# Patient Record
Sex: MALE | Race: ASIAN
[De-identification: names, ages, dates, MRNs, and addresses within clinical notes are randomized per-mention and may not be internally consistent; named-entity substitution may affect disease eponyms.]

---

## 2017-01-13 ENCOUNTER — HOSPITAL ENCOUNTER (INPATIENT)
Dept: HOSPITAL 62 - ER | Age: 67
LOS: 3 days | Discharge: HOME | DRG: 191 | End: 2017-01-16
Attending: FAMILY MEDICINE | Admitting: FAMILY MEDICINE
Payer: MEDICARE

## 2017-01-13 DIAGNOSIS — E11.9: ICD-10-CM

## 2017-01-13 DIAGNOSIS — Z88.6: ICD-10-CM

## 2017-01-13 DIAGNOSIS — Z90.49: ICD-10-CM

## 2017-01-13 DIAGNOSIS — D35.01: ICD-10-CM

## 2017-01-13 DIAGNOSIS — E66.9: ICD-10-CM

## 2017-01-13 DIAGNOSIS — I10: ICD-10-CM

## 2017-01-13 DIAGNOSIS — E78.5: ICD-10-CM

## 2017-01-13 DIAGNOSIS — Z79.899: ICD-10-CM

## 2017-01-13 DIAGNOSIS — J45.901: ICD-10-CM

## 2017-01-13 DIAGNOSIS — Z88.8: ICD-10-CM

## 2017-01-13 DIAGNOSIS — Z85.038: ICD-10-CM

## 2017-01-13 DIAGNOSIS — J84.9: ICD-10-CM

## 2017-01-13 DIAGNOSIS — J44.1: Primary | ICD-10-CM

## 2017-01-13 DIAGNOSIS — F17.200: ICD-10-CM

## 2017-01-13 PROCEDURE — 87804 INFLUENZA ASSAY W/OPTIC: CPT

## 2017-01-13 PROCEDURE — 87077 CULTURE AEROBIC IDENTIFY: CPT

## 2017-01-13 PROCEDURE — 83735 ASSAY OF MAGNESIUM: CPT

## 2017-01-13 PROCEDURE — 71010: CPT

## 2017-01-13 PROCEDURE — 80048 BASIC METABOLIC PNL TOTAL CA: CPT

## 2017-01-13 PROCEDURE — 96374 THER/PROPH/DIAG INJ IV PUSH: CPT

## 2017-01-13 PROCEDURE — 85025 COMPLETE CBC W/AUTO DIFF WBC: CPT

## 2017-01-13 PROCEDURE — 82962 GLUCOSE BLOOD TEST: CPT

## 2017-01-13 PROCEDURE — 93010 ELECTROCARDIOGRAM REPORT: CPT

## 2017-01-13 PROCEDURE — 87205 SMEAR GRAM STAIN: CPT

## 2017-01-13 PROCEDURE — 94799 UNLISTED PULMONARY SVC/PX: CPT

## 2017-01-13 PROCEDURE — 84484 ASSAY OF TROPONIN QUANT: CPT

## 2017-01-13 PROCEDURE — 93005 ELECTROCARDIOGRAM TRACING: CPT

## 2017-01-13 PROCEDURE — 71275 CT ANGIOGRAPHY CHEST: CPT

## 2017-01-13 PROCEDURE — 82553 CREATINE MB FRACTION: CPT

## 2017-01-13 PROCEDURE — 94640 AIRWAY INHALATION TREATMENT: CPT

## 2017-01-13 PROCEDURE — 80053 COMPREHEN METABOLIC PANEL: CPT

## 2017-01-13 PROCEDURE — 82803 BLOOD GASES ANY COMBINATION: CPT

## 2017-01-13 PROCEDURE — 99291 CRITICAL CARE FIRST HOUR: CPT

## 2017-01-13 PROCEDURE — 36415 COLL VENOUS BLD VENIPUNCTURE: CPT

## 2017-01-13 PROCEDURE — 82550 ASSAY OF CK (CPK): CPT

## 2017-01-13 PROCEDURE — 87070 CULTURE OTHR SPECIMN AEROBIC: CPT

## 2017-01-13 PROCEDURE — 87040 BLOOD CULTURE FOR BACTERIA: CPT

## 2017-01-13 NOTE — ER DOCUMENT REPORT
ED Respiratory Problem





- General


Chief Complaint: Breathing Difficulty


Stated Complaint: TROUBLE BREATHING


Time seen by provider: 23:53


Mode of Arrival: Ambulatory


Information source: Patient, Relative - Wife


TRAVEL OUTSIDE OF THE [place] IN LAST 30 DAYS: No





- HPI


Patient complains to provider of: Asthma, COPD, Cough, Short of breath


Onset: This morning


Duration: Worse/persistent


Quality of pain: No pain


Severity: Moderate


Context: Hx COPD, Smoker


Short of Breath: Moderate


Chest pain/discomfort: Tightness


Cough: Nonproductive


At home treatment: Bronchodilators


Associated symptoms: Congestion, Cough, Difficulty breathing, Short of breath, 

Wheezing


Similar symptoms previously: Yes


Recently seen / treated by doctor: No


Notes: 


Patient is a 66-year-old male who is a smoker and has a reported history of 

asthma, who presents to the emergency room complaining of difficulty breathing 

with a dry cough and chest tightness that started early in the morning and 

worsened throughout the day, he has used albuterol at home with minimal relief, 

he denies any sick contacts, no recent travel





- Related Data


Allergies/Adverse Reactions: 


 





diphenhydramine [From Benadryl] Adverse Reaction (Intermediate, Verified 01/14/ 17 00:23)


 


ibuprofen [From Motrin] Adverse Reaction (Intermediate, Verified 01/14/17 00:29)


 Bleeding











Past Medical History





- General


Information source: Patient, Relative - Wife





- Social History


Smoking Status: Current Every Day Smoker


Frequency of alcohol use: None


Drug Abuse: None


Family History: Reviewed & Not Pertinent


Renal/ Medical History: Denies: Hx Peritoneal Dialysis





Review of Systems





- Review of Systems


Constitutional: No symptoms reported


EENT: No symptoms reported


Cardiovascular: No symptoms reported


Respiratory: See HPI


Gastrointestinal: No symptoms reported


Genitourinary: No symptoms reported


Male Genitourinary: No symptoms reported


Musculoskeletal: No symptoms reported


Skin: No symptoms reported


Hematologic/Lymphatic: No symptoms reported


Neurological/Psychological: No symptoms reported


-: Yes All other systems reviewed and negative





Physical Exam





- Vital signs


Vitals: 


 











Resp


 


 24 H


 


 01/13/17 23:30











Interpretation: Normal





- General


General appearance: Appears well, Alert





- HEENT


Head: Normocephalic, Atraumatic


Eyes: Normal


Pupils: PERRL





- Respiratory


Respiratory status: Respiratory distress, Labored, Tachypnea


Chest status: Nontender


Breath sounds: Decreased air movement, Nonproductive cough, Wheezing


Chest palpation: Normal





- Cardiovascular


Rhythm: Regular


Heart sounds: Normal auscultation


Murmur: No





- Abdominal


Inspection: Normal, Other - Healed midline scar


Distension: No distension


Bowel sounds: Normal


Tenderness: Nontender


Organomegaly: No organomegaly





- Back


Back: Normal, Nontender





- Extremities


General upper extremity: Normal inspection, Nontender, Normal color, Normal ROM

, Normal temperature


General lower extremity: Normal inspection, Nontender, Normal color, Normal ROM

, Normal temperature, Normal weight bearing.  No: Carol's sign





- Neurological


Neuro grossly intact: Yes


Cognition: Normal


Orientation: AAOx4


Josh Coma Scale Eye Opening: Spontaneous


Reading Coma Scale Verbal: Oriented


Josh Coma Scale Motor: Obeys Commands


Josh Coma Scale Total: 15


Speech: Normal


Motor strength normal: LUE, RUE, LLE, RLE


Sensory: Normal





- Psychological


Associated symptoms: Normal affect, Normal mood





- Skin


Skin Temperature: Warm


Skin Moisture: Dry


Skin Color: Normal





Course





- Re-evaluation


Re-evalutation: 





01/14/17 02:50


Patient hypoxic and tachycardic on arrival with difficulty breathing that 

started earlier in the day and Crestor the day, symptoms likely result of 

smoking and COPD, however a CTA was performed to rule out a PE, no PE was seen 

but worsening diffuse interstitial lung markings consistent with chronic lung 

disease were seen, patient had multiple breathing treatments in the emergency 

room with moderate relief of symptoms, however still continues to have diffuse 

wheezing with a pulse ox in the 88-92 range, he did briefly remove his oxygen 

and his pulse ox dropped down to 85%, therefore patient was discussed with the 

hospitalist who agrees to admit for further evaluation and treatment





- Vital Signs


Vital signs: 


 











Temp Pulse Resp BP Pulse Ox


 


 98.6 F   113 H  24 H  144/65 H  91 L


 


 01/13/17 23:33  01/13/17 23:33  01/13/17 23:33  01/13/17 23:33  01/14/17 00:00














- Laboratory


Result Diagrams: 


 01/14/17 00:15





 01/14/17 00:15


Laboratory results interpreted by me: 


 











  01/14/17 01/14/17





  00:15 00:15


 


WBC  12.8 H 


 


RBC  4.11 L 


 


Hgb  11.8 L 


 


Hct  37.8 L 


 


MCHC  31.1 L 


 


RDW  14.2 H 


 


Seg Neutrophils %  83.8 H 


 


Lymphocytes %  8.1 L 


 


Absolute Neutrophils  10.7 H 


 


BUN   23 H


 


Calcium   10.4 H


 


Creatine Kinase   544 H














- Diagnostic Test


Radiology reviewed: Image reviewed, Reports reviewed





- EKG Interpretation by Me


EKG shows normal: Sinus rhythm


Rate: Tachycardia





- Transfer of Care


Care transferred to following provider: Dr. Isaac





Critical Care Note





- Critical Care Note


Total time excluding time spent on procedures (mins): 40


Comments: 


Patient arrived hypoxic and tachycardic, requiring multiple breathing treatments

, close observation and admission for COPD exacerbation





Discharge





- Discharge


Clinical Impression: 


 COPD exacerbation





Condition: Fair


Disposition: ADMITTED AS INPATIENT


Admitting Provider: Hospitalist


Unit Admitted: Telemetry

## 2017-01-14 LAB
ALBUMIN SERPL-MCNC: 4.1 G/DL (ref 3.5–5)
ALP SERPL-CCNC: 73 U/L (ref 38–126)
ALT SERPL-CCNC: 42 U/L (ref 21–72)
ANION GAP SERPL CALC-SCNC: 14 MMOL/L (ref 5–19)
AST SERPL-CCNC: 30 U/L (ref 17–59)
BASE EXCESS BLDV CALC-SCNC: -0.7 MMOL/L
BASE EXCESS BLDV CALC-SCNC: 2.5 MMOL/L
BASOPHILS # BLD AUTO: 0.1 10^3/UL (ref 0–0.2)
BASOPHILS NFR BLD AUTO: 0.5 % (ref 0–2)
BILIRUB DIRECT SERPL-MCNC: 0 MG/DL (ref 0–0.3)
BILIRUB SERPL-MCNC: 0.3 MG/DL (ref 0.2–1.3)
BUN SERPL-MCNC: 23 MG/DL (ref 7–20)
CALCIUM: 10.4 MG/DL (ref 8.4–10.2)
CHLORIDE SERPL-SCNC: 103 MMOL/L (ref 98–107)
CK MB SERPL-MCNC: 2.89 NG/ML (ref ?–4.55)
CK SERPL-CCNC: 544 U/L (ref 55–170)
CO2 SERPL-SCNC: 28 MMOL/L (ref 22–30)
CREAT SERPL-MCNC: 1.18 MG/DL (ref 0.52–1.25)
EOSINOPHIL # BLD AUTO: 0.1 10^3/UL (ref 0–0.6)
EOSINOPHIL NFR BLD AUTO: 1.2 % (ref 0–6)
ERYTHROCYTE [DISTWIDTH] IN BLOOD BY AUTOMATED COUNT: 14.2 % (ref 11.5–14)
GLUCOSE SERPL-MCNC: 96 MG/DL (ref 75–110)
HCO3 BLDV-SCNC: 24.7 MMOL/L (ref 20–32)
HCO3 BLDV-SCNC: 29.2 MMOL/L (ref 20–32)
HCT VFR BLD CALC: 37.8 % (ref 37.9–51)
HGB BLD-MCNC: 11.8 G/DL (ref 13.5–17)
HGB HCT DIFFERENCE: -2.4
LYMPHOCYTES # BLD AUTO: 1 10^3/UL (ref 0.5–4.7)
LYMPHOCYTES NFR BLD AUTO: 8.1 % (ref 13–45)
MAGNESIUM SERPL-MCNC: 1.7 MG/DL (ref 1.6–2.3)
MCH RBC QN AUTO: 28.6 PG (ref 27–33.4)
MCHC RBC AUTO-ENTMCNC: 31.1 G/DL (ref 32–36)
MCV RBC AUTO: 92 FL (ref 80–97)
MONOCYTES # BLD AUTO: 0.8 10^3/UL (ref 0.1–1.4)
MONOCYTES NFR BLD AUTO: 6.4 % (ref 3–13)
NEUTROPHILS # BLD AUTO: 10.7 10^3/UL (ref 1.7–8.2)
NEUTS SEG NFR BLD AUTO: 83.8 % (ref 42–78)
PCO2 BLDV: 43.6 MMHG (ref 35–63)
PCO2 BLDV: 53.2 MMHG (ref 35–63)
PH BLDV: 7.36 [PH] (ref 7.3–7.42)
PH BLDV: 7.37 [PH] (ref 7.3–7.42)
POTASSIUM SERPL-SCNC: 4.4 MMOL/L (ref 3.6–5)
PROT SERPL-MCNC: 6.9 G/DL (ref 6.3–8.2)
RBC # BLD AUTO: 4.11 10^6/UL (ref 4.35–5.55)
SODIUM SERPL-SCNC: 144.7 MMOL/L (ref 137–145)
TROPONIN I SERPL-MCNC: 0.01 NG/ML
WBC # BLD AUTO: 12.8 10^3/UL (ref 4–10.5)

## 2017-01-14 PROCEDURE — 3E0F73Z INTRODUCTION OF ANTI-INFLAMMATORY INTO RESPIRATORY TRACT, VIA NATURAL OR ARTIFICIAL OPENING: ICD-10-PCS | Performed by: FAMILY MEDICINE

## 2017-01-14 RX ADMIN — Medication SCH ML: at 14:46

## 2017-01-14 RX ADMIN — IPRATROPIUM BROMIDE AND ALBUTEROL SULFATE SCH ML: 2.5; .5 SOLUTION RESPIRATORY (INHALATION) at 14:49

## 2017-01-14 RX ADMIN — LANSOPRAZOLE SCH MG: 30 TABLET, ORALLY DISINTEGRATING, DELAYED RELEASE ORAL at 05:58

## 2017-01-14 RX ADMIN — METHYLPREDNISOLONE SODIUM SUCCINATE SCH MG: 125 INJECTION, POWDER, FOR SOLUTION INTRAMUSCULAR; INTRAVENOUS at 09:05

## 2017-01-14 RX ADMIN — INSULIN LISPRO PRN UNIT: 100 INJECTION, SOLUTION INTRAVENOUS; SUBCUTANEOUS at 06:20

## 2017-01-14 RX ADMIN — TAMSULOSIN HYDROCHLORIDE SCH MG: 0.4 CAPSULE ORAL at 09:08

## 2017-01-14 RX ADMIN — ENOXAPARIN SODIUM SCH MG: 40 INJECTION SUBCUTANEOUS at 08:59

## 2017-01-14 RX ADMIN — Medication SCH ML: at 21:10

## 2017-01-14 RX ADMIN — FLUTICASONE PROPIONATE AND SALMETEROL SCH INH: 50; 250 POWDER RESPIRATORY (INHALATION) at 09:01

## 2017-01-14 RX ADMIN — SODIUM CHLORIDE PRN ML: 0.45 INJECTION, SOLUTION INTRAVENOUS at 06:02

## 2017-01-14 RX ADMIN — MONTELUKAST SODIUM SCH MG: 10 TABLET, FILM COATED ORAL at 21:10

## 2017-01-14 RX ADMIN — Medication SCH ML: at 06:00

## 2017-01-14 RX ADMIN — ATORVASTATIN CALCIUM SCH MG: 40 TABLET, FILM COATED ORAL at 21:09

## 2017-01-14 RX ADMIN — SODIUM CHLORIDE PRN ML: 0.45 INJECTION, SOLUTION INTRAVENOUS at 18:50

## 2017-01-14 RX ADMIN — CEFTRIAXONE SCH ML: 1 INJECTION, SOLUTION INTRAVENOUS at 21:09

## 2017-01-14 RX ADMIN — IPRATROPIUM BROMIDE AND ALBUTEROL SULFATE SCH ML: 2.5; .5 SOLUTION RESPIRATORY (INHALATION) at 08:49

## 2017-01-14 RX ADMIN — BENAZEPRIL HYDROCHLORIDE SCH MG: 20 TABLET ORAL at 09:06

## 2017-01-14 RX ADMIN — IPRATROPIUM BROMIDE AND ALBUTEROL SULFATE SCH ML: 2.5; .5 SOLUTION RESPIRATORY (INHALATION) at 20:41

## 2017-01-14 RX ADMIN — METHYLPREDNISOLONE SODIUM SUCCINATE SCH MG: 125 INJECTION, POWDER, FOR SOLUTION INTRAMUSCULAR; INTRAVENOUS at 17:10

## 2017-01-14 RX ADMIN — AMLODIPINE BESYLATE SCH MG: 10 TABLET ORAL at 09:07

## 2017-01-14 RX ADMIN — AZITHROMYCIN MONOHYDRATE SCH MG: 500 INJECTION, POWDER, LYOPHILIZED, FOR SOLUTION INTRAVENOUS at 14:46

## 2017-01-14 RX ADMIN — CETIRIZINE HYDROCHLORIDE SCH MG: 10 TABLET, FILM COATED ORAL at 14:45

## 2017-01-14 RX ADMIN — INSULIN LISPRO PRN UNIT: 100 INJECTION, SOLUTION INTRAVENOUS; SUBCUTANEOUS at 17:22

## 2017-01-14 RX ADMIN — FLUTICASONE PROPIONATE AND SALMETEROL SCH INH: 50; 250 POWDER RESPIRATORY (INHALATION) at 21:10

## 2017-01-14 NOTE — PDOC H&P
History of Present Illness


Admission Date/PCP: 


  01/14/17 02:43





  


\[facility]\""


Patient complains of: difficulty breathing


History of Present Illness: 


ERIN MORENO is a 66 year old male is known underlying asthma, having 

undergone COPD testing one week ago, with results still pending, along with 

half pack-a-day smoker, who presents to the emergency room for evaluation of 

above complaint.





Patient has been discussed with emergency room physician who evaluated the 

patient. 





Has had a dry cough and mild chest discomfort for the past several days.  

However, over the last 24 hours, he has had progressive difficulty breathing, 

particularly noticeable with much of any exertion.  Was in significant 

respiratory distress upon arrival in the emergency room, but has progressed 

nicely with treatment so far.  However, still some wheezing, which he does on 

most days, and some shortness of breath, along with oxygen requirement.  

Hospitalist service contacted for admission.





Subjective fever.  No nausea vomiting or diarrhea.  No antibiotics or hospital 

stay in the last 3 months.  No prior intubation for respiratory distress.





Mild chest tightness and discomfort when his shortness of breath is worse.  

None at present..











Laboratory results are listed in [facility] and are reviewed. 








X-ray summary results are listed below, with full report(s) reviewed. .











EKG reviewed. 











Social history/personal habits: .  Has children.  Retired.  Half-pack of 

cigarettes per day.  No alcohol or illicit drug use.











Allergies/adverse reactions are listed in [facility] and are reviewed. 








Home medications are reviewed by bottle review and discussion with patient and 

wife, who is present at his side with his approval, and are to be [facility] 

[facility]. Home medications initially autopopulated into 

[employer] for [employer] may not accurately reflect patient's true medications, dosages, and/or 

frequencies. 





Compliant with medications.  No recent medication changes.











REVIEW OF SYSTEMS: 





Constitutional:  See history and present illness. 





Eyes:   No current vision complaints. 





ENT: No swallowing problems or complaints.   No hearing problems or complaints. 





Pulmonary:  See history and present illness. 





Cardiovascular:  See history and present illness.  





Gastrointestinal: No current complaints, including nausea or vomiting. 





Skin: No current complaints, including rashes. 





Hematologic: No unusual easy bruising or bleeding.  





Neurologic: No current complaints, including numbness or tingling. 





Musculoskeletal: No current complaints, including painful joints.   





Psychiatric: No current complaints, including anxiety or depression.   





Endocrine: No current complaints, including polyuria. 





Genitourinary: No current complaints, including dysuria. 








PHYSICAL EXAMINATION:


 


5 feet 5 inches tall.  92.1 kg.  Blood pressure 123/94.  Pulse 118 and regular.

  Respirations are 22 and unlabored.  91% saturation on 2 L oxygen per nasal 

cannula.  Temperature 98.6.





Obese otherwise well-developed probable [place] male, who appears to feel a 

bit under the weather, so to speak.  Pleasant awake alert and cooperative 

however. mildly anxious, without agitation.





Skin is warm and dry.  No grossly obvious evidence of rash in areas of skin 

examined.  No subcutaneous nodules palpated. 





ENT: Hearing grossly normal  to normal conversation.  Tongue midline on 

protrusion pink and slightly tacky.  





Eyes: No scleral icterus.  Pupils equal and reactive to light at 4 mm.  Pink 

conjunctivae. 





Neck is supple and nontender to gentle active range of motion and palpation.  

Midline trachea.  No palpable thyroid nodule mass enlargement or tenderness. 





Lymphatic: No palpable cervical or clavicular nodes. 





Neck and lymphatic exams limited by patient body habitus. 





Psychiatric: Reasonable insight into acute and chronic medical issues.  

Oriented to time location and why here. 





Lungs: Auscultation reveals equal breath sounds bilaterally.  No use of 

accessory respiratory muscles.  Faint brief expiratory wheezing bilaterally, 

with mild diffuse slightly coarse breath sounds.





Cardiovascular: Heart regular rate and rhythm, without gallop murmur or rub.  

No carotid or abdominal aortic bruits.  No ankle or pedal edema.   palpable 

dorsalis pedis pulses. 





Abdomen: soft,  obese,   nontender with positive bowel sounds.  Unable to 

adequately evaluate abdomen for masses or organomegaly due to body habitus.





Extremities: Feet are warm and dry.  No calf tenderness to compression.  No 

grossly obvious visual evidence of calf swelling.  Gentle manipulation of lower 

extremities fails to reveal any obvious evidence of injury or instability to 

knees hips or ankles. 





Neurologic: Moves upper extremities grossly normally.  Patellar reflexes 

absent.  Absent Babinski.  Light touch is intact at feet.  Dorsiflexion and 

plantarflexion of feet 5 / 5 and symmetric. 











Past Medical History


Cardiac Medical History: Reports: Hypertension


Pulmonary Medical History: Reports: Asthma


Endocrine Medical History: Reports: Diabetes Mellitus Type 2





Past Surgical History


Past Surgical History: Reports: Other - Segmental colectomy in the 1990s for 

colon cancer.





Social History


Information Source: Patient, Emergency Med Personnel, [facility] Records


Lives with: Spouse/Significant other


Smoking Status: Current Every Day Smoker


Frequency of Alcohol Use: None


Drugs: None





- Advance Directive


Resuscitation Status: Full Code


Surrogate healthcare decision maker:: 


Wife





Family History


Family History: Reviewed & Not Pertinent


Parental Family History Reviewed: Yes


Children Family History Reviewed: Yes


Sibling(s) Family History Reviewed.: Yes





Medication/Allergy


Home Medications: 








Albuterol Sulfate [Proair HFA] 2 puff IH Q4HP PRN 01/14/17 


Atorvastatin Calcium 40 mg PO QHS 01/14/17 


Fexofenadine HCl 180 mg PO DAILY 01/14/17 


Fluticasone/Salmeterol [Advair 250-50 Diskus] 1 puff IH BID 01/14/17 


Metformin HCl [Metformin HCl ER] 500 mg PO BID 01/14/17 


Montelukast Sodium 10 mg PO QHS 01/14/17 


Tamsulosin HCl 0.8 mg PO DAILY 01/14/17 


Benazepril HCl [Lotensin 20 mg Tablet] 40 mg PO QAM #60 tablet 01/16/17 


Levalbuterol HCl [Xopenex Neb 1.25 mg/3 ml Ampul] 1.25 mg NEB Q4H #60 vial.neb 

01/16/17 


Levofloxacin [Levaquin 750 mg Tablet] 750 mg PO DAILY #5 tablet 01/16/17 


Metoprolol Tartrate [Lopressor 25 mg Tablet] 25 mg PO Q12 #60 tab 01/16/17 


Prednisone [Sterapred Ds] 1 pkg PO ASDIR PRN 12 Days 01/16/17 


Tiotropium Bromide [Spiriva Handihaler 18 mcg/dose (30 Dose)] 1 cap IH DAILY #

30 capsule 01/16/17 








Allergies/Adverse Reactions: 


 





diphenhydramine [From Benadryl] Adverse Reaction (Intermediate, Verified 01/14/ 17 00:23)


 


ibuprofen [From Motrin] Adverse Reaction (Intermediate, Verified 01/14/17 00:29)


 Bleeding











Physical Exam


Vital Signs: 


 











Temp Pulse Resp BP Pulse Ox


 


 98.6 F   113 H  24 H  144/65 H  91 L


 


 01/13/17 23:33  01/13/17 23:33  01/13/17 23:33  01/13/17 23:33  01/14/17 00:00














Results


Impressions: 


 





Chest X-Ray  01/13/17 23:42


IMPRESSION:  Worsened moderate chronic interstitial lung disease pattern.


 








Chest/Abdomen CTA  01/14/17 01:22


IMPRESSION:


1. No evidence of pulmonary emboli.


2. Moderate chronic interstitial lung disease pattern.  Interval worsening.


3. 1.9 cm right adrenal adenoma.


 














Assessment & Plan





- Diagnosis


(1) Adrenal adenoma


Qualifiers: 


     Laterality: right        Qualified Code(s): D35.01 - Benign neoplasm of 

right adrenal gland  


Is this a current diagnosis for this admission?: YesPlan: 


Outpatient follow-up by primary care provider.








(2) Asthma exacerbation


Is this a current diagnosis for this admission?: YesPlan: 


Patient will be admitted under COPD and asthma exacerbation  protocol.  

Incentive spirometry twice a day.  Scheduled    DuoNeb's.  PRN  albuterol nebs





Solu-Medrol    Prevacid for gastritis prophylaxis. 





Antibiotics will consist of intravenous Zithromax along with Rocephin..





I strongly encouraged patient to notify staff should patient feel that 

respiratory status is worsening.





Patient is a full code.





I have strongly encouraged patient not to get out of bed without notifying staff

, , to avoid a fall with injury.





Knee high SCDs for DVT prophylaxis, along with subcutaneous Lovenox 





Impression and plans were discussed with patient, and wife, both of whom concur.





Time spent in evaluation and management of patient: 70 minutes.








(3) Diabetes mellitus type 2 in obese


Is this a current diagnosis for this admission?: YesPlan: 


Ice chips at the present time.  Advance diet as tolerated. Resume home 

medications as appropriate once these have been  reviewed.  We'll hold 

metformin given his CT angiogram.  Accu-Cheks with appropriate sliding scale 

coverage.








(4) HTN (hypertension)


Qualifiers: 


     Hypertension type: essential hypertension        Qualified Code(s): I10 - 

Essential (primary) hypertension  


Is this a current diagnosis for this admission?: YesPlan: 


Resume home medications as appropriate once these have been  reviewed.  








(5) Hyperlipidemia


Qualifiers: 


     Hyperlipidemia type: unspecified        Qualified Code(s): E78.5 - 

Hyperlipidemia, unspecified  


Is this a current diagnosis for this admission?: YesPlan: 


Resume home medications as appropriate once these have been  reviewed.  








(6) Tobacco dependency


Is this a current diagnosis for this admission?: YesPlan: 


When necessary nicotine patch.








(7) COPD exacerbation


Is this a current diagnosis for this admission?: Yes








- Inpatient Certification


Based on my medical assessment, after consideration of the patient's 

comorbidities, presenting symptoms, or acuity I expect that the services needed 

warrant INPATIENT care.: Yes


I certify that my determination is in accordance with my understanding of 

Medicare's requirements for reasonable and necessary INPATIENT services [42 CFR 

412.3e].: Yes


Medical Necessity: Need Close Monitoring Due to Risk of Patient Decompensation, 

Need For IV Fluids, Need For Continuous Telemetry Monitoring, Need for 

Nebulizer Therapy and Monitoring of Response, Need for IV Antibiotics, Risk of 

Complication if Not Cared For in Hospital


Post Hospital Care: D/C or Transfer Summary

## 2017-01-14 NOTE — EKG REPORT
SEVERITY:- OTHERWISE NORMAL ECG -

SINUS TACHYCARDIA

:

Confirmed by: Ibeth Sanabria MD 14-Jan-2017 09:22:49

## 2017-01-15 LAB
ANION GAP SERPL CALC-SCNC: 13 MMOL/L (ref 5–19)
ANISOCYTOSIS BLD QL SMEAR: SLIGHT
BASOPHILS NFR BLD MANUAL: 0 % (ref 0–2)
BUN SERPL-MCNC: 24 MG/DL (ref 7–20)
CALCIUM: 10.1 MG/DL (ref 8.4–10.2)
CHLORIDE SERPL-SCNC: 110 MMOL/L (ref 98–107)
CO2 SERPL-SCNC: 27 MMOL/L (ref 22–30)
CREAT SERPL-MCNC: 1.14 MG/DL (ref 0.52–1.25)
EOSINOPHIL NFR BLD MANUAL: 0 % (ref 0–6)
ERYTHROCYTE [DISTWIDTH] IN BLOOD BY AUTOMATED COUNT: 14.6 % (ref 11.5–14)
GLUCOSE SERPL-MCNC: 163 MG/DL (ref 75–110)
HCT VFR BLD CALC: 36.5 % (ref 37.9–51)
HGB BLD-MCNC: 11.7 G/DL (ref 13.5–17)
HGB HCT DIFFERENCE: -1.4
MCH RBC QN AUTO: 29.1 PG (ref 27–33.4)
MCHC RBC AUTO-ENTMCNC: 32.2 G/DL (ref 32–36)
MCV RBC AUTO: 91 FL (ref 80–97)
NEUTS BAND NFR BLD MANUAL: 5 % (ref 3–5)
OVALOCYTES BLD QL SMEAR: SLIGHT
POTASSIUM SERPL-SCNC: 4.7 MMOL/L (ref 3.6–5)
RBC # BLD AUTO: 4.03 10^6/UL (ref 4.35–5.55)
SODIUM SERPL-SCNC: 149.7 MMOL/L (ref 137–145)
TOTAL CELLS COUNTED BLD: 100
TOXIC GRANULES BLD QL SMEAR: SLIGHT
VARIANT LYMPHS NFR BLD MANUAL: 4 % (ref 13–45)
WBC # BLD AUTO: 16.6 10^3/UL (ref 4–10.5)

## 2017-01-15 RX ADMIN — METHYLPREDNISOLONE SODIUM SUCCINATE SCH MG: 125 INJECTION, POWDER, FOR SOLUTION INTRAMUSCULAR; INTRAVENOUS at 10:14

## 2017-01-15 RX ADMIN — LEVALBUTEROL HYDROCHLORIDE SCH MG: 1.25 SOLUTION RESPIRATORY (INHALATION) at 14:19

## 2017-01-15 RX ADMIN — Medication SCH ML: at 05:46

## 2017-01-15 RX ADMIN — FLUTICASONE PROPIONATE AND SALMETEROL SCH INH: 50; 250 POWDER RESPIRATORY (INHALATION) at 10:14

## 2017-01-15 RX ADMIN — Medication SCH ML: at 21:59

## 2017-01-15 RX ADMIN — CEFTRIAXONE SCH ML: 1 INJECTION, SOLUTION INTRAVENOUS at 21:59

## 2017-01-15 RX ADMIN — CETIRIZINE HYDROCHLORIDE SCH MG: 10 TABLET, FILM COATED ORAL at 10:21

## 2017-01-15 RX ADMIN — TAMSULOSIN HYDROCHLORIDE SCH MG: 0.4 CAPSULE ORAL at 10:14

## 2017-01-15 RX ADMIN — ATORVASTATIN CALCIUM SCH MG: 40 TABLET, FILM COATED ORAL at 21:58

## 2017-01-15 RX ADMIN — AZITHROMYCIN MONOHYDRATE SCH MG: 500 INJECTION, POWDER, LYOPHILIZED, FOR SOLUTION INTRAVENOUS at 10:14

## 2017-01-15 RX ADMIN — INSULIN LISPRO PRN UNIT: 100 INJECTION, SOLUTION INTRAVENOUS; SUBCUTANEOUS at 18:04

## 2017-01-15 RX ADMIN — MONTELUKAST SODIUM SCH MG: 10 TABLET, FILM COATED ORAL at 21:58

## 2017-01-15 RX ADMIN — METHYLPREDNISOLONE SODIUM SUCCINATE SCH MG: 125 INJECTION, POWDER, FOR SOLUTION INTRAMUSCULAR; INTRAVENOUS at 02:19

## 2017-01-15 RX ADMIN — FLUTICASONE PROPIONATE AND SALMETEROL SCH INH: 50; 250 POWDER RESPIRATORY (INHALATION) at 21:58

## 2017-01-15 RX ADMIN — AMLODIPINE BESYLATE SCH MG: 10 TABLET ORAL at 07:50

## 2017-01-15 RX ADMIN — IPRATROPIUM BROMIDE SCH MG: 0.5 SOLUTION RESPIRATORY (INHALATION) at 20:05

## 2017-01-15 RX ADMIN — INSULIN LISPRO PRN UNIT: 100 INJECTION, SOLUTION INTRAVENOUS; SUBCUTANEOUS at 07:49

## 2017-01-15 RX ADMIN — ENOXAPARIN SODIUM SCH MG: 40 INJECTION SUBCUTANEOUS at 07:49

## 2017-01-15 RX ADMIN — LANSOPRAZOLE SCH MG: 30 TABLET, ORALLY DISINTEGRATING, DELAYED RELEASE ORAL at 05:46

## 2017-01-15 RX ADMIN — LEVALBUTEROL HYDROCHLORIDE SCH MG: 1.25 SOLUTION RESPIRATORY (INHALATION) at 20:05

## 2017-01-15 RX ADMIN — Medication SCH ML: at 13:30

## 2017-01-15 RX ADMIN — INSULIN LISPRO PRN UNIT: 100 INJECTION, SOLUTION INTRAVENOUS; SUBCUTANEOUS at 12:26

## 2017-01-15 RX ADMIN — METOPROLOL TARTRATE SCH MG: 25 TABLET, FILM COATED ORAL at 20:46

## 2017-01-15 RX ADMIN — IPRATROPIUM BROMIDE AND ALBUTEROL SULFATE SCH ML: 2.5; .5 SOLUTION RESPIRATORY (INHALATION) at 08:26

## 2017-01-15 RX ADMIN — IPRATROPIUM BROMIDE SCH MG: 0.5 SOLUTION RESPIRATORY (INHALATION) at 14:19

## 2017-01-15 RX ADMIN — SODIUM CHLORIDE PRN ML: 0.45 INJECTION, SOLUTION INTRAVENOUS at 03:54

## 2017-01-15 NOTE — PDOC PROGRESS REPORT
Subjective


Progress Note for:: 01/15/17


Subjective:: 


Patient is breathing better.  Wheezing is less.  Shortness of breath improved 

by 50%.  Positive cough but no pleurisy.  No chest pressure.  No diarrhea, no 

chills or fever.  No nausea vomiting abdominal pain.





Physical Exam


Vital Signs: 


 











Temp Pulse Resp BP Pulse Ox


 


 98.2 F   104 H  16   142/85 H  92 


 


 01/15/17 07:36  01/15/17 08:26  01/15/17 08:26  01/15/17 07:36  01/15/17 07:36








 Intake & Output











 01/14/17 01/15/17 01/16/17





 06:59 06:59 06:59


 


Intake Total  3949 


 


Output Total  650 


 


Balance  3299 


 


Weight  87 kg 











General appearance: PRESENT: no acute distress, obese


Head exam: PRESENT: normocephalic


Eye exam: PRESENT: EOMI, PERRLA


Mouth exam: PRESENT: moist, neck supple


Neck exam: ABSENT: JVD


Respiratory exam: PRESENT: wheezes - Minimal bilateral, other - Air entry is 

fair to good.


Cardiovascular exam: PRESENT: RRR, tachycardia


GI/Abdominal exam: PRESENT: normal bowel sounds, soft.  ABSENT: distended, 

tenderness


Extremities exam: ABSENT: pedal edema


Neurological exam: PRESENT: alert, awake, oriented to situation


Skin exam: PRESENT: dry, warm.  ABSENT: cyanosis





Results


Laboratory Results: 


 





 01/15/17 04:54 





 01/15/17 04:54 





 











  01/15/17 01/15/17





  04:54 04:54


 


WBC  16.6 H 


 


RBC  4.03 L 


 


Hgb  11.7 L 


 


Hct  36.5 L 


 


MCV  91 


 


MCH  29.1 


 


MCHC  32.2 


 


RDW  14.6 H 


 


Plt Count  274 


 


Seg Neutrophils %  Not Reportable 


 


Lymphocytes %  Not Reportable 


 


Monocytes %  Not Reportable 


 


Eosinophils %  Not Reportable 


 


Basophils %  Not Reportable 


 


Absolute Neutrophils  Not Reportable 


 


Absolute Lymphocytes  Not Reportable 


 


Absolute Monocytes  Not Reportable 


 


Absolute Eosinophils  Not Reportable 


 


Absolute Basophils  Not Reportable 


 


Sodium   149.7 H


 


Potassium   4.7


 


Chloride   110 H


 


Carbon Dioxide   27


 


Anion Gap   13


 


BUN   24 H


 


Creatinine   1.14


 


Est GFR ( Amer)   > 60


 


Est GFR (Non-Af Amer)   > 60


 


Glucose   163 H


 


Calcium   10.1











Impressions: 


 





Chest X-Ray  01/13/17 23:42


IMPRESSION:  Worsened moderate chronic interstitial lung disease pattern.


 








Chest/Abdomen CTA  01/14/17 01:22


IMPRESSION:


1. No evidence of pulmonary emboli.


2. Moderate chronic interstitial lung disease pattern.  Interval worsening.


3. 1.9 cm right adrenal adenoma.


 














Assessment & Plan





- Diagnosis


(1) COPD exacerbation


Is this a current diagnosis for this admission?: Yes





(2) Interstitial lung disease


Is this a current diagnosis for this admission?: Yes





(3) Adrenal adenoma


Qualifiers: 


     Laterality: right        Qualified Code(s): D35.01 - Benign neoplasm of 

right adrenal gland  


Is this a current diagnosis for this admission?: Yes





(4) Diabetes mellitus type 2 in obese


Is this a current diagnosis for this admission?: Yes





(5) HTN (hypertension)


Qualifiers: 


     Hypertension type: essential hypertension        Qualified Code(s): I10 - 

Essential (primary) hypertension  


Is this a current diagnosis for this admission?: Yes





(6) Hyperlipidemia


Qualifiers: 


     Hyperlipidemia type: unspecified        Qualified Code(s): E78.5 - 

Hyperlipidemia, unspecified  


Is this a current diagnosis for this admission?: Yes








- Time


Time Spent with patient: 25-34 minutes





- Plan Summary


Plan Summary: 


Patient is wanting to go home.  I advised him to stay for an order today and 

hopefully by tomorrow we will be able to.  We are going to switch the patient 

to oral steroids.  Discontinue albuterol as switched to Xopenex.  Continue 

ipratropium.  Decrease intravenous fluids.  Add Spiriva.  Continue supportive 

care.

## 2017-01-16 VITALS — DIASTOLIC BLOOD PRESSURE: 86 MMHG | SYSTOLIC BLOOD PRESSURE: 128 MMHG

## 2017-01-16 RX ADMIN — Medication SCH ML: at 05:40

## 2017-01-16 RX ADMIN — LEVALBUTEROL HYDROCHLORIDE SCH MG: 1.25 SOLUTION RESPIRATORY (INHALATION) at 08:21

## 2017-01-16 RX ADMIN — IPRATROPIUM BROMIDE SCH MG: 0.5 SOLUTION RESPIRATORY (INHALATION) at 08:20

## 2017-01-16 RX ADMIN — ENOXAPARIN SODIUM SCH MG: 40 INJECTION SUBCUTANEOUS at 08:14

## 2017-01-16 RX ADMIN — FLUTICASONE PROPIONATE AND SALMETEROL SCH INH: 50; 250 POWDER RESPIRATORY (INHALATION) at 09:54

## 2017-01-16 RX ADMIN — AZITHROMYCIN MONOHYDRATE SCH MG: 500 INJECTION, POWDER, LYOPHILIZED, FOR SOLUTION INTRAVENOUS at 09:55

## 2017-01-16 RX ADMIN — METOPROLOL TARTRATE SCH MG: 25 TABLET, FILM COATED ORAL at 01:17

## 2017-01-16 RX ADMIN — LANSOPRAZOLE SCH MG: 30 TABLET, ORALLY DISINTEGRATING, DELAYED RELEASE ORAL at 05:38

## 2017-01-16 RX ADMIN — LEVALBUTEROL HYDROCHLORIDE SCH MG: 1.25 SOLUTION RESPIRATORY (INHALATION) at 02:15

## 2017-01-16 RX ADMIN — BENAZEPRIL HYDROCHLORIDE SCH MG: 20 TABLET ORAL at 08:14

## 2017-01-16 RX ADMIN — CETIRIZINE HYDROCHLORIDE SCH MG: 10 TABLET, FILM COATED ORAL at 09:54

## 2017-01-16 RX ADMIN — TAMSULOSIN HYDROCHLORIDE SCH MG: 0.4 CAPSULE ORAL at 09:54

## 2017-01-16 RX ADMIN — METOPROLOL TARTRATE SCH MG: 25 TABLET, FILM COATED ORAL at 05:37

## 2017-01-16 RX ADMIN — IPRATROPIUM BROMIDE SCH MG: 0.5 SOLUTION RESPIRATORY (INHALATION) at 02:15

## 2017-01-16 NOTE — PDOC DISCHARGE SUMMARY
General





- Admit/Disc Date/PCP


Admission Date/Primary Care Provider: 


  01/14/17 03:35





  





Discharge Date: 01/16/17





- Discharge Diagnosis


(1) COPD exacerbation


Is this a current diagnosis for this admission?: Yes





(2) Interstitial lung disease


Is this a current diagnosis for this admission?: Yes





(3) Adrenal adenoma


Is this a current diagnosis for this admission?: Yes





(4) Diabetes mellitus type 2 in obese


Is this a current diagnosis for this admission?: Yes





(5) HTN (hypertension)


Is this a current diagnosis for this admission?: Yes





(6) Hyperlipidemia


Is this a current diagnosis for this admission?: Yes








- Additional Information


Resuscitation Status: Full Code


Discharge Diet: Cardiac - low-fat low-salt, Diabetic - no concentrated sweets


Discharge Activity: Activity As Tolerated, Balance Activity w/Rest


Home Medications: 








Albuterol Sulfate [Proair HFA] 2 puff IH Q4HP PRN 01/14/17 


Atorvastatin Calcium 40 mg PO QHS 01/14/17 


Fexofenadine HCl 180 mg PO DAILY 01/14/17 


Fluticasone/Salmeterol [Advair 250-50 Diskus] 1 puff IH BID 01/14/17 


Metformin HCl [Metformin HCl ER] 500 mg PO BID 01/14/17 


Montelukast Sodium 10 mg PO QHS 01/14/17 


Tamsulosin HCl 0.8 mg PO DAILY 01/14/17 


Benazepril HCl [Lotensin 20 mg Tablet] 40 mg PO QAM #60 tablet 01/16/17 


Levalbuterol HCl [Xopenex Neb 1.25 mg/3 ml Ampul] 1.25 mg NEB Q4H #60 vial.neb 

01/16/17 


Levofloxacin [Levaquin 750 mg Tablet] 750 mg PO DAILY #5 tablet 01/16/17 


Metoprolol Tartrate [Lopressor 25 mg Tablet] 25 mg PO Q12 #60 tab 01/16/17 


Prednisone [Sterapred Ds] 1 pkg PO ASDIR PRN 12 Days 01/16/17 


Tiotropium Bromide [Spiriva Handihaler 18 mcg/dose (30 Dose)] 1 cap IH DAILY #

30 capsule 01/16/17 








Additional Information: 


Follow-up final report of sputum culture and blood culture as outpatient with 

primary care physician or Dr. Bravo





History of Present Illness


Patient complains of: Shortness of breath


History of Present Illness: 


ERIN MORENO is a 66 year old male with history of asthma and diabetes 

hypertension and a chronic smoker presents to the hospital with shortness of 

breath.  There is associated coughing and wheezing.  Patient has significant 

shortness of breath on exertion.  Patient was recently tested for COPD and 

result is pending.  For details please refer to history and physical 

examination performed by the admitting physician.








Hospital Course


Hospital Course: 


The patient was admitted to [facility].  Supplemental oxygen was given.  Intravenous 

steroids and around-the-clock nebulizer was given.  Intravenous antibiotic was 

likewise started.  Patient improved.  Patient however has tachycardia and the 

patient has dyspnea on exertion and noted increasing heart rate with activity.  

His albuterol was shifted to Xopenex.  His Norvasc was shifted to metoprolol.  

Heart rate improved dyspnea on exertion improved significantly as well.  

Patient reports improvement.  Wheezing has resolved.  Patient wanted to go home 

and continue treatment on an outpatient basis.  The rest of the hospital stays 

essentially unremarkable.  Patient was discharged home with above instructions.





Physical Exam


Vital Signs: 


 











Temp Pulse Resp BP Pulse Ox


 


 98.0 F   76   15   128/86 H  90 L


 


 01/16/17 08:12  01/16/17 08:20  01/16/17 08:20  01/16/17 08:12  01/16/17 08:20








 Intake & Output











 01/15/17 01/16/17 01/17/17





 06:59 06:59 06:59


 


Intake Total 3949 6391 


 


Output Total 650  


 


Balance 3299 6391 


 


Weight 87 kg 89.1 kg 











General appearance: PRESENT: no acute distress, cooperative


Head exam: PRESENT: normocephalic


Eye exam: PRESENT: EOMI, PERRLA


Mouth exam: PRESENT: moist, neck supple


Neck exam: ABSENT: JVD


Respiratory exam: PRESENT: clear to auscultation alexey.  ABSENT: rhonchi, wheezes


Cardiovascular exam: PRESENT: RRR.  ABSENT: gallop


GI/Abdominal exam: PRESENT: normal bowel sounds, soft.  ABSENT: tenderness


Extremities exam: ABSENT: pedal edema


Psychiatric exam: PRESENT: normal mood


Skin exam: PRESENT: dry, warm.  ABSENT: cyanosis





Results


Laboratory Results: 


 





 01/15/17 04:54 





 01/15/17 04:54 








Impressions: 


 





Chest X-Ray  01/13/17 23:42


IMPRESSION:  Worsened moderate chronic interstitial lung disease pattern.


 








Chest/Abdomen CTA  01/14/17 01:22


IMPRESSION:


1. No evidence of pulmonary emboli.


2. Moderate chronic interstitial lung disease pattern.  Interval worsening.


3. 1.9 cm right adrenal adenoma.


 














Qualifiers


**PATEINT BEING DISCHARGED WITH ANY OF THE FOLLOWING DIAGNOSIS?: No





Plan


Discharge Plan: 


Follow-up with primary care physician in one week.  Follow-up with pulmonary as 

outpatient in 1-2 weeks.


Time Spent: Less than 30 Minutes

## 2017-03-17 ENCOUNTER — HOSPITAL ENCOUNTER (OUTPATIENT)
Dept: HOSPITAL 62 - OD | Age: 67
End: 2017-03-17
Attending: INTERNAL MEDICINE
Payer: MEDICARE

## 2017-03-17 DIAGNOSIS — N18.3: ICD-10-CM

## 2017-03-17 DIAGNOSIS — I12.9: Primary | ICD-10-CM

## 2017-03-17 DIAGNOSIS — R80.9: ICD-10-CM

## 2017-03-17 DIAGNOSIS — E11.9: ICD-10-CM

## 2017-03-17 LAB
ANION GAP SERPL CALC-SCNC: 15 MMOL/L (ref 5–19)
APPEARANCE UR: CLEAR
BILIRUB UR QL STRIP: NEGATIVE
BUN SERPL-MCNC: 23 MG/DL (ref 7–20)
CALCIUM: 10.6 MG/DL (ref 8.4–10.2)
CHLORIDE SERPL-SCNC: 107 MMOL/L (ref 98–107)
CO2 SERPL-SCNC: 25 MMOL/L (ref 22–30)
CREAT SERPL-MCNC: 1.18 MG/DL (ref 0.52–1.25)
ERYTHROCYTE [DISTWIDTH] IN BLOOD BY AUTOMATED COUNT: 14.1 % (ref 11.5–14)
GLUCOSE SERPL-MCNC: 119 MG/DL (ref 75–110)
GLUCOSE UR STRIP-MCNC: NEGATIVE MG/DL
HCT VFR BLD CALC: 37.7 % (ref 37.9–51)
HGB BLD-MCNC: 12.5 G/DL (ref 13.5–17)
HGB HCT DIFFERENCE: -0.2
KETONES UR STRIP-MCNC: NEGATIVE MG/DL
MCH RBC QN AUTO: 29.1 PG (ref 27–33.4)
MCHC RBC AUTO-ENTMCNC: 33.1 G/DL (ref 32–36)
MCV RBC AUTO: 88 FL (ref 80–97)
NITRITE UR QL STRIP: NEGATIVE
PH UR STRIP: 6 [PH] (ref 5–9)
POTASSIUM SERPL-SCNC: 3.9 MMOL/L (ref 3.6–5)
PROT UR STRIP-MCNC: NEGATIVE MG/DL
RBC # BLD AUTO: 4.28 10^6/UL (ref 4.35–5.55)
SODIUM SERPL-SCNC: 146.7 MMOL/L (ref 137–145)
SP GR UR STRIP: 1
UROBILINOGEN UR-MCNC: NEGATIVE MG/DL (ref ?–2)
WBC # BLD AUTO: 11.7 10^3/UL (ref 4–10.5)

## 2017-03-17 PROCEDURE — 82570 ASSAY OF URINE CREATININE: CPT

## 2017-03-17 PROCEDURE — 36415 COLL VENOUS BLD VENIPUNCTURE: CPT

## 2017-03-17 PROCEDURE — 81001 URINALYSIS AUTO W/SCOPE: CPT

## 2017-03-17 PROCEDURE — 80048 BASIC METABOLIC PNL TOTAL CA: CPT

## 2017-03-17 PROCEDURE — 85027 COMPLETE CBC AUTOMATED: CPT

## 2017-03-17 PROCEDURE — 84156 ASSAY OF PROTEIN URINE: CPT

## 2017-03-18 LAB
CREAT UR-MCNC: 22.5 MG/DL
PROTEIN/CREAT RATIO: 609 MG/G CREAT (ref 0–200)

## 2018-08-30 ENCOUNTER — HOSPITAL ENCOUNTER (EMERGENCY)
Dept: HOSPITAL 62 - ER | Age: 68
Discharge: HOME | End: 2018-08-30
Payer: MEDICARE

## 2018-08-30 VITALS — SYSTOLIC BLOOD PRESSURE: 124 MMHG | DIASTOLIC BLOOD PRESSURE: 91 MMHG

## 2018-08-30 DIAGNOSIS — R11.2: ICD-10-CM

## 2018-08-30 DIAGNOSIS — R42: Primary | ICD-10-CM

## 2018-08-30 DIAGNOSIS — J44.9: ICD-10-CM

## 2018-08-30 DIAGNOSIS — E11.9: ICD-10-CM

## 2018-08-30 DIAGNOSIS — I10: ICD-10-CM

## 2018-08-30 DIAGNOSIS — R31.9: ICD-10-CM

## 2018-08-30 DIAGNOSIS — H55.00: ICD-10-CM

## 2018-08-30 DIAGNOSIS — Z87.891: ICD-10-CM

## 2018-08-30 LAB
ADD MANUAL DIFF: NO
ALBUMIN SERPL-MCNC: 4.5 G/DL (ref 3.5–5)
ALP SERPL-CCNC: 81 U/L (ref 38–126)
ALT SERPL-CCNC: 45 U/L (ref 21–72)
ANION GAP SERPL CALC-SCNC: 14 MMOL/L (ref 5–19)
APPEARANCE UR: CLEAR
APTT PPP: (no result) S
AST SERPL-CCNC: 27 U/L (ref 17–59)
BASOPHILS # BLD AUTO: 0.1 10^3/UL (ref 0–0.2)
BASOPHILS NFR BLD AUTO: 0.6 % (ref 0–2)
BILIRUB DIRECT SERPL-MCNC: 0.3 MG/DL (ref 0–0.4)
BILIRUB SERPL-MCNC: 0.3 MG/DL (ref 0.2–1.3)
BILIRUB UR QL STRIP: NEGATIVE
BUN SERPL-MCNC: 23 MG/DL (ref 7–20)
CALCIUM: 10.1 MG/DL (ref 8.4–10.2)
CHLORIDE SERPL-SCNC: 106 MMOL/L (ref 98–107)
CO2 SERPL-SCNC: 26 MMOL/L (ref 22–30)
EOSINOPHIL # BLD AUTO: 0.3 10^3/UL (ref 0–0.6)
EOSINOPHIL NFR BLD AUTO: 2.5 % (ref 0–6)
ERYTHROCYTE [DISTWIDTH] IN BLOOD BY AUTOMATED COUNT: 15.2 % (ref 11.5–14)
GLUCOSE SERPL-MCNC: 138 MG/DL (ref 75–110)
GLUCOSE UR STRIP-MCNC: NEGATIVE MG/DL
HCT VFR BLD CALC: 41.6 % (ref 37.9–51)
HGB BLD-MCNC: 13.5 G/DL (ref 13.5–17)
KETONES UR STRIP-MCNC: NEGATIVE MG/DL
LYMPHOCYTES # BLD AUTO: 2.4 10^3/UL (ref 0.5–4.7)
LYMPHOCYTES NFR BLD AUTO: 22.8 % (ref 13–45)
MCH RBC QN AUTO: 29.3 PG (ref 27–33.4)
MCHC RBC AUTO-ENTMCNC: 32.6 G/DL (ref 32–36)
MCV RBC AUTO: 90 FL (ref 80–97)
MONOCYTES # BLD AUTO: 0.7 10^3/UL (ref 0.1–1.4)
MONOCYTES NFR BLD AUTO: 6.2 % (ref 3–13)
NEUTROPHILS # BLD AUTO: 7.1 10^3/UL (ref 1.7–8.2)
NEUTS SEG NFR BLD AUTO: 67.9 % (ref 42–78)
NITRITE UR QL STRIP: NEGATIVE
PH UR STRIP: 7 [PH] (ref 5–9)
PLATELET # BLD: 308 10^3/UL (ref 150–450)
POTASSIUM SERPL-SCNC: 4 MMOL/L (ref 3.6–5)
PROT SERPL-MCNC: 7.8 G/DL (ref 6.3–8.2)
PROT UR STRIP-MCNC: 100 MG/DL
RBC # BLD AUTO: 4.63 10^6/UL (ref 4.35–5.55)
SODIUM SERPL-SCNC: 146.1 MMOL/L (ref 137–145)
SP GR UR STRIP: 1.01
TOTAL CELLS COUNTED % (AUTO): 100 %
UROBILINOGEN UR-MCNC: NEGATIVE MG/DL (ref ?–2)
WBC # BLD AUTO: 10.5 10^3/UL (ref 4–10.5)

## 2018-08-30 PROCEDURE — 93005 ELECTROCARDIOGRAM TRACING: CPT

## 2018-08-30 PROCEDURE — 85025 COMPLETE CBC W/AUTO DIFF WBC: CPT

## 2018-08-30 PROCEDURE — 99285 EMERGENCY DEPT VISIT HI MDM: CPT

## 2018-08-30 PROCEDURE — 81001 URINALYSIS AUTO W/SCOPE: CPT

## 2018-08-30 PROCEDURE — S0119 ONDANSETRON 4 MG: HCPCS

## 2018-08-30 PROCEDURE — 36415 COLL VENOUS BLD VENIPUNCTURE: CPT

## 2018-08-30 PROCEDURE — 80053 COMPREHEN METABOLIC PANEL: CPT

## 2018-08-30 PROCEDURE — 70498 CT ANGIOGRAPHY NECK: CPT

## 2018-08-30 PROCEDURE — 96360 HYDRATION IV INFUSION INIT: CPT

## 2018-08-30 PROCEDURE — 93010 ELECTROCARDIOGRAM REPORT: CPT

## 2018-08-30 PROCEDURE — 70496 CT ANGIOGRAPHY HEAD: CPT

## 2018-08-30 NOTE — RADIOLOGY REPORT (SQ)
EXAM DESCRIPTION:  CTA HEAD



COMPLETED DATE/TIME:  8/30/2018 8:17 pm



REASON FOR STUDY:  Persistent dizziness



COMPARISON:  None.



TECHNIQUE:  Post IV contrast scanning, thin section axial imaging through the brain to evaluate the a
rterial structures.  Source and MIP images are saved and reviewed on PACS.

Advanced 3D imaging as volume-rendering, MIPs, SSD performed? yes

All CT scanners at this facility use dose modulation, iterative reconstruction, and/or weight based d
osing when appropriate to reduce radiation dose to as low as reasonably achievable (ALARA).

[facility]: Dose Right  [facility]: CareDose    [facility]: Dose Right    [facility]: Teradose 4D    [facility]: Smart Technologies



CONTRAST TYPE AND DOSE:  70 mL Omnipaque 350- low osmolar.



RENAL FUNCTION:  BUN 23 creatinine 1.14



LIMITATIONS:  None.



FINDINGS:  [place]: The anterior, middle, posterior cerebral arteries are all patent.  No ev
idence of aneurysm or focal stenosis.

POSTERIOR CIRCULATION: The distal vertebral arteries are patent as is the basilar artery. No aneurysm
.

BRAIN: No gross enhancing lesions as visualized.  The superior cerebral hemispheres are not included 
in the field of view.

BONES: Intact as visualized.

SINUSES: No fluid or mucosal thickening.

OTHER: No other significant finding.



IMPRESSION:  NO CTA EVIDENCE OF STENOSIS OR ANEURYSM OF THE [place] OF [place]AC[place].



TECHNICAL DOCUMENTATION:  JOB ID:  [id number]

Quality ID # [id number]: Final reports with documentation of one or more dose reduction techniques (e.g., Au
tomated exposure control, adjustment of the mA and/or kV according to patient size, use of iterative 
reconstruction technique)

 2011 [employer]- All Rights Reserved



Reading location - IP/workstation name: [email or web address]

## 2018-08-30 NOTE — ER DOCUMENT REPORT
ED General





- General


Stated Complaint: DIZZINESS/VOMITING


Time Seen by Provider: 08/30/18 17:29


Mode of Arrival: Ambulatory


Information source: Patient


Notes: 





67-year-old male with hypertension, type 2 diabetes, COPD, asthma presents via 

EMS with complaint of dizziness, nausea and vomiting.  Patient's wife is at the 

bedside and provides the majority of the history because the patient is hard of 

hearing.  She states that patient has had intermittent episodes of dizziness 

over the last 3 days.  She states that he says that he feels like the room is 

spinning.  Patient does state that it is worse when he turns to his left side 

or sits up.  Today the dizziness worsened and the patient had a large episode 

of emesis which was nonbloody.  The wife was unable to get him to the car so 

she called EMS.  Patient denies recent illness, prior similar symptoms, ear pain

, rhinorrhea.  Shortness of breath, chest pain, abdominal pain.  He has been 

otherwise well.


TRAVEL OUTSIDE OF THE [place] IN LAST 30 DAYS: No





- HPI


Onset: Just prior to arrival


Onset/Duration: Sudden


Quality of pain: No pain


Severity: None


Associated symptoms: Nausea, Vomiting.  denies: Chest pain, Shortness of breath


Exacerbated by: Movement


Relieved by: Remaining still


Similar symptoms previously: No


Recently seen / treated by doctor: No





- Related Data


Allergies/Adverse Reactions: 


 





diphenhydramine [From Benadryl] Adverse Reaction (Intermediate, Verified 01/14/ 17 00:23)


 


ibuprofen [From Motrin] Adverse Reaction (Intermediate, Verified 01/14/17 00:29)


 Bleeding











Past Medical History





- General


Information source: Patient, Relative, [facility]





- Social History


Smoking Status: Former Smoker


Frequency of alcohol use: None


Drug Abuse: None


Lives with: Spouse/Significant other


Family History: Reviewed & Not Pertinent


Patient has suicidal ideation: No


Patient has homicidal ideation: No





- Past Medical History


Cardiac Medical History: Reports: Hx Hypertension


Pulmonary Medical History: Reports: Hx Asthma


Endocrine Medical History: Reports: Hx Diabetes Mellitus Type 2


Renal/ Medical History: Denies: Hx Peritoneal Dialysis


Past Surgical History: Reports: Other - Segmental colectomy in the 1990s for 

colon cancer.





- Immunizations


Hx Diphtheria, Pertussis, Tetanus Vaccination: Yes





Review of Systems





- Review of Systems


Notes: 





REVIEW OF SYSTEMS:


CONSTITUTIONAL :  Denies fever,  chills, or sweats.  Denies recent illness. 

Denies weight loss, recent hospitalizations. 


EENT: Denies visual changes, eye pain.    Denies nasal or sinus congestion or 

discharge.  Denies sore throat, oral lesions, difficulty swallowing.


CARDIOVASCULAR:  Denies chest pain.  Denies palpitations. Denies lower 

extremity edema.


RESPIRATORY:  Denies cough, cold, or chest congestion.  Denies shortness of 

breath, wheezing.


GASTROINTESTINAL:  Denies abdominal pain or distention.  Denies  diarrhea.  

Denies blood in vomitus, stools, or per rectum.  Denies black, tarry stools.  

Denies constipation.  


GENITOURINARY:  Denies difficulty urinating, painful urination,  frequency, 

blood in urine, or  vaginal discharge.


MUSCULOSKELETAL:  Denies back or neck pain or stiffness.  Denies joint pain or 

swelling.


SKIN:   Denies rash, lesions or sores.


HEMATOLOGIC :   Denies easy bruising or bleeding.


LYMPHATIC:  Denies swollen glands.


NEUROLOGICAL:  Denies confusion or altered mental status.  Denies passing out 

or loss of consciousness.  Denies  lightheadedness.  Denies headache.  Denies 

weakness or paralysis.  Denies problems difficulty with ambulation, slurred 

speech.  Denies sensory loss, numbness, or tingling.  Denies seizures.


PSYCHIATRIC:  Denies anxiety or stress.  Denies depression, suicidal ideation, 

or homicidal ideation.  Denies visual or auditory hallucinations.








Physical Exam





- Vital signs


Vitals: 


 











Resp BP Pulse Ox


 


 17   161/85 H  89 L


 


 08/30/18 17:27  08/30/18 17:27  08/30/18 17:27














- Notes


Notes: 





PHYSICAL EXAMINATION:





GENERAL: Well-appearing, well-nourished and in no acute distress.





HEAD: Atraumatic, normocephalic.





EYES: Pupils equal round and reactive to light, extraocular movements intact, 

sclera anicteric, conjunctiva are normal.  Horizontal nystagmus.





ENT: Nares patent, oropharynx clear without exudates.  Moist mucous membranes.





NECK: Normal range of motion, supple without lymphadenopathy





LUNGS: Breath sounds clear to auscultation bilaterally and equal.  No wheezes 

rales or rhonchi.





HEART: Regular rate and rhythm without murmurs





ABDOMEN: Soft, nontender, nondistended abdomen.  No guarding, no rebound.  No 

masses appreciated.





Musculoskeletal: Normal range of motion, no pitting or edema.  No cyanosis.





NEUROLOGICAL: Cranial nerves grossly intact.  Normal speech, normal gait.  

Normal sensory, motor exams 





PSYCH: Normal mood, normal affect.





SKIN: Warm, Dry, normal turgor, no rashes or lesions noted.





Course





- Re-evaluation


Re-evalutation: 





08/30/18 21:09





Laboratory











  08/30/18 08/30/18 08/30/18





  17:28 17:28 19:30


 


WBC  10.5  


 


RBC  4.63  


 


Hgb  13.5  


 


Hct  41.6  


 


MCV  90  


 


MCH  29.3  


 


MCHC  32.6  


 


RDW  15.2 H  


 


Plt Count  308  


 


Seg Neutrophils %  67.9  


 


Lymphocytes %  22.8  


 


Monocytes %  6.2  


 


Eosinophils %  2.5  


 


Basophils %  0.6  


 


Absolute Neutrophils  7.1  


 


Absolute Lymphocytes  2.4  


 


Absolute Monocytes  0.7  


 


Absolute Eosinophils  0.3  


 


Absolute Basophils  0.1  


 


Sodium   146.1 H 


 


Potassium   4.0 


 


Chloride   106 


 


Carbon Dioxide   26 


 


Anion Gap   14 


 


BUN   23 H 


 


Creatinine   1.14 


 


Est GFR ( Amer)   > 60 


 


Est GFR (Non-Af Amer)   > 60 


 


Glucose   138 H 


 


Calcium   10.1 


 


Total Bilirubin   0.3 


 


Direct Bilirubin   0.3 


 


Neonat Total Bilirubin   Not Reportable 


 


Neonat Direct Bilirubin   Not Reportable 


 


Neonat Indirect Bili   Not Reportable 


 


AST   27 


 


ALT   45 


 


Alkaline Phosphatase   81 


 


Total Protein   7.8 


 


Albumin   4.5 


 


Urine Color    STRAW


 


Urine Appearance    CLEAR


 


Urine pH    7.0


 


Ur Specific Gravity    1.008


 


Urine Protein    100 H


 


Urine Glucose (UA)    NEGATIVE


 


Urine Ketones    NEGATIVE


 


Urine Blood    MODERATE H


 


Urine Nitrite    NEGATIVE


 


Urine Bilirubin    NEGATIVE


 


Urine Urobilinogen    NEGATIVE


 


Ur Leukocyte Esterase    NEGATIVE


 


Urine WBC (Auto)    0


 


Urine RBC (Auto)    20


 


U Hyaline Cast (Auto)    1


 


Urine Mucus (Auto)    RARE


 


Urine Ascorbic Acid    NEGATIVE














 





Neck CTA  08/30/18 00:00


IMPRESSION:  NORMAL CTA OF THE EXTRA-CRANIAL CAROTID AND VERTEBRAL ARTERIES.


 








Head CTA  08/30/18 17:43


IMPRESSION:  NO CTA EVIDENCE OF STENOSIS OR ANEURYSM OF THE [place] OF [place].


 








67-year-old male with hypertension, type 2 diabetes, COPD, asthma presents via 

EMS with complaint of dizziness, nausea and vomiting.  Patient's wife is at the 

bedside and provides the majority of the history because the patient is hard of 

hearing.  She states that patient has had intermittent episodes of dizziness 

over the last 3 days.  She states that he says that he feels like the room is 

spinning.  Patient does state that it is worse when he turns to his left side 

or sits up.  Today the dizziness worsened and the patient had a large episode 

of emesis which was nonbloody.  Vital signs stable upon arrival.  Patient does 

not appear toxic or dehydrated.  He does appear to be uncomfortable.  Patient 

with horizontal nystagmus.  Normal neurologic exam.  She received IV fluids, 

meclizine, Valium.  CTA of the head and neck were obtained and within normal 

limits.  CBC shows no leukocytosis or anemia.  CMP shows mild elevation of 

glucose.  Urinalysis does show hematuria which the patient reports is chronic.  

Patient reevaluated and reports resolution of his dizziness.  He is ambulating 

without difficulty.  He is tolerating fluids.  Patient provided the opportunity 

to ask questions, and express concerns.  Discharge instructions discussed. 

Patient is agreeable with discharge home.  Return indications explained and 

discussed with the patient who displays understanding.  Patient encouraged to 

return to the emergency department immediately with any concerns.


08/30/18 23:34





08/30/18 23:34








- Vital Signs


Vital signs: 


 











Temp Pulse Resp BP Pulse Ox


 


       17   124/91 H  93 


 


       08/30/18 21:31  08/30/18 21:32  08/30/18 21:32














- Laboratory


Result Diagrams: 


 08/30/18 17:28





 08/30/18 17:28


Laboratory results interpreted by me: 


 











  08/30/18 08/30/18 08/30/18





  17:28 17:28 19:30


 


RDW  15.2 H  


 


Sodium   146.1 H 


 


BUN   23 H 


 


Glucose   138 H 


 


Urine Protein    100 H


 


Urine Blood    MODERATE H














- Diagnostic Test


Radiology reviewed: Image reviewed, Reports reviewed





- EKG Interpretation by Me


EKG shows normal: Sinus rhythm


Rate: Normal





Discharge





- Discharge


Clinical Impression: 


 Vertigo, Elevated blood pressure reading





Nausea & vomiting


Qualifiers:


 Vomiting type: unspecified Vomiting Intractability: non-intractable Qualified 

Code(s): R11.2 - Nausea with vomiting, unspecified





Hematuria


Qualifiers:


 Hematuria type: unspecified type Qualified Code(s): R31.9 - Hematuria, 

unspecified





Condition: Good


Disposition: HOME, SELF-CARE


Instructions:  Hematuria ([facility]), Nausea or Vomiting, Nonspecific ([facility]), Vertigo (

[facility])


Prescriptions: 


Diazepam [Valium 5 mg Tablet] 5 mg PO Q12H PRN #5 tablet


 PRN Reason: Dizziness


Meclizine HCl 25 mg PO Q6H PRN #14 tab.chew


 PRN Reason: Dizziness


Ondansetron [Zofran Odt 4 mg Tablet] 1 - 2 tab PO Q4H PRN #15 tab.rapdis


 PRN Reason: For Nausea/Vomiting


Forms:  Elevated Blood Pressure


Referrals: 


ARTEM SHORE MD [ACTIVE STAFF] - Follow up in 3-5 days

## 2018-08-30 NOTE — RADIOLOGY REPORT (SQ)
EXAM DESCRIPTION:  CTA NECK



COMPLETED DATE/TIME:  8/30/2018 8:17 pm



REASON FOR STUDY:  PERSISTENT DIZZINESS



COMPARISON:  None.



TECHNIQUE:  Axial dynamic scanning technique with  dynamic contrast enhancement through the extra-cra
nial carotid and vertebral  arteries.  Multiplanar reconstruction.  3-D MIPS and Volume-rendered imag
es  acquired at the workstation and saved to PACS.  Images are reviewed in soft  tissue, bone, lung w
indows.

All CT scanners at this facility use dose modulation, iterative reconstruction, and/or weight based d
osing when appropriate to reduce radiation dose to as low as reasonably achievable (ALARA).

[facility]: Dose Right  [facility]: CareDose    [facility]: Dose Right    [facility]: Teradose 4D    [facility]: [employer]



CONTRAST TYPE AND DOSE:  contrast/concentration: Isovue 350.00 mg/ml; Total Contrast Delivered: 70.0 
ml; Total Saline Delivered: 75.0 ml



RENAL FUNCTION:  BUN 23 creatinine 1.14



LIMITATIONS:  None.



FINDINGS:  AORTIC ARCH: Normal three-vessel origin.  Bilateral subclavian arteries are patent.  No  d
issection.

RIGHT CAROTIDS: Patent common, internal and external carotid arteries without suggestion of significa
nt stenosis or irregular plaque.  No dissection.

RIGHT VERTEBRAL: Patent.  No dissection.

LEFT CAROTIDS: Patent common, internal and external carotid arteries without suggestion of significan
t stenosis or irregular plaque.  No dissection.

LEFT VERTEBRAL: Patent.  No dissection.

OTHER: No other significant finding.

OTHER: 3-D  reconstructions confirm findings.



IMPRESSION:  NORMAL CTA OF THE EXTRA-CRANIAL CAROTID AND VERTEBRAL ARTERIES.



COMMENT:  Quality ID #195: Measurements of distal internal carotid diameter were used as the denomina
tor for stenosis measurement.



TECHNICAL DOCUMENTATION:  JOB ID:  [id number]

Quality ID # 436: Final reports with documentation of one or more dose reduction techniques (e.g., Au
tomated exposure control, adjustment of the mA and/or kV according to patient size, use of iterative 
reconstruction technique)

 2011 [employer]- All Rights Reserved



Reading location - IP/workstation name: [email or web address]

## 2018-08-31 NOTE — EKG REPORT
SEVERITY:- ABNORMAL ECG -

SINUS RHYTHM

NONSPECIFIC ST-T CHANGES- INFERIOR LEADS

:

Confirmed by: Albin San MD 31-Aug-2018 07:36:12

## 2020-01-03 ENCOUNTER — HOSPITAL ENCOUNTER (EMERGENCY)
Dept: HOSPITAL 62 - ER | Age: 70
Discharge: HOME | End: 2020-01-03
Payer: MEDICARE

## 2020-01-03 VITALS — SYSTOLIC BLOOD PRESSURE: 124 MMHG | DIASTOLIC BLOOD PRESSURE: 89 MMHG

## 2020-01-03 DIAGNOSIS — R06.00: ICD-10-CM

## 2020-01-03 DIAGNOSIS — I10: ICD-10-CM

## 2020-01-03 DIAGNOSIS — E11.9: ICD-10-CM

## 2020-01-03 DIAGNOSIS — R06.02: ICD-10-CM

## 2020-01-03 DIAGNOSIS — Z99.81: ICD-10-CM

## 2020-01-03 DIAGNOSIS — R05: ICD-10-CM

## 2020-01-03 DIAGNOSIS — R09.81: ICD-10-CM

## 2020-01-03 DIAGNOSIS — R07.9: ICD-10-CM

## 2020-01-03 DIAGNOSIS — J44.1: Primary | ICD-10-CM

## 2020-01-03 DIAGNOSIS — Z87.891: ICD-10-CM

## 2020-01-03 LAB
ADD MANUAL DIFF: NO
ALBUMIN SERPL-MCNC: 4.3 G/DL (ref 3.5–5)
ALP SERPL-CCNC: 69 U/L (ref 38–126)
ANION GAP SERPL CALC-SCNC: 8 MMOL/L (ref 5–19)
APPEARANCE UR: CLEAR
APTT PPP: (no result) S
AST SERPL-CCNC: 22 U/L (ref 17–59)
BASOPHILS # BLD AUTO: 0.1 10^3/UL (ref 0–0.2)
BASOPHILS NFR BLD AUTO: 0.7 % (ref 0–2)
BILIRUB DIRECT SERPL-MCNC: 0.1 MG/DL (ref 0–0.4)
BILIRUB SERPL-MCNC: 0.4 MG/DL (ref 0.2–1.3)
BILIRUB UR QL STRIP: NEGATIVE
BUN SERPL-MCNC: 17 MG/DL (ref 7–20)
CALCIUM: 10.4 MG/DL (ref 8.4–10.2)
CHLORIDE SERPL-SCNC: 104 MMOL/L (ref 98–107)
CO2 SERPL-SCNC: 32 MMOL/L (ref 22–30)
EOSINOPHIL # BLD AUTO: 0.3 10^3/UL (ref 0–0.6)
EOSINOPHIL NFR BLD AUTO: 3.4 % (ref 0–6)
ERYTHROCYTE [DISTWIDTH] IN BLOOD BY AUTOMATED COUNT: 14.7 % (ref 11.5–14)
GLUCOSE SERPL-MCNC: 97 MG/DL (ref 75–110)
GLUCOSE UR STRIP-MCNC: NEGATIVE MG/DL
HCT VFR BLD CALC: 38.1 % (ref 37.9–51)
HGB BLD-MCNC: 12.8 G/DL (ref 13.5–17)
KETONES UR STRIP-MCNC: NEGATIVE MG/DL
LYMPHOCYTES # BLD AUTO: 0.7 10^3/UL (ref 0.5–4.7)
LYMPHOCYTES NFR BLD AUTO: 9.3 % (ref 13–45)
MCH RBC QN AUTO: 30.5 PG (ref 27–33.4)
MCHC RBC AUTO-ENTMCNC: 33.6 G/DL (ref 32–36)
MCV RBC AUTO: 91 FL (ref 80–97)
MONOCYTES # BLD AUTO: 0.7 10^3/UL (ref 0.1–1.4)
MONOCYTES NFR BLD AUTO: 9.1 % (ref 3–13)
NEUTROPHILS # BLD AUTO: 6.1 10^3/UL (ref 1.7–8.2)
NEUTS SEG NFR BLD AUTO: 77.5 % (ref 42–78)
NITRITE UR QL STRIP: NEGATIVE
PH UR STRIP: 6 [PH] (ref 5–9)
PLATELET # BLD: 252 10^3/UL (ref 150–450)
POTASSIUM SERPL-SCNC: 4.9 MMOL/L (ref 3.6–5)
PROT SERPL-MCNC: 7.4 G/DL (ref 6.3–8.2)
PROT UR STRIP-MCNC: 100 MG/DL
RBC # BLD AUTO: 4.2 10^6/UL (ref 4.35–5.55)
SP GR UR STRIP: 1
TOTAL CELLS COUNTED % (AUTO): 100 %
UROBILINOGEN UR-MCNC: NEGATIVE MG/DL (ref ?–2)
WBC # BLD AUTO: 7.8 10^3/UL (ref 4–10.5)

## 2020-01-03 PROCEDURE — 96374 THER/PROPH/DIAG INJ IV PUSH: CPT

## 2020-01-03 PROCEDURE — 81001 URINALYSIS AUTO W/SCOPE: CPT

## 2020-01-03 PROCEDURE — 94640 AIRWAY INHALATION TREATMENT: CPT

## 2020-01-03 PROCEDURE — 36415 COLL VENOUS BLD VENIPUNCTURE: CPT

## 2020-01-03 PROCEDURE — 85025 COMPLETE CBC W/AUTO DIFF WBC: CPT

## 2020-01-03 PROCEDURE — 71046 X-RAY EXAM CHEST 2 VIEWS: CPT

## 2020-01-03 PROCEDURE — 99285 EMERGENCY DEPT VISIT HI MDM: CPT

## 2020-01-03 PROCEDURE — 96376 TX/PRO/DX INJ SAME DRUG ADON: CPT

## 2020-01-03 PROCEDURE — 80053 COMPREHEN METABOLIC PANEL: CPT

## 2020-01-03 PROCEDURE — 96375 TX/PRO/DX INJ NEW DRUG ADDON: CPT

## 2020-01-03 PROCEDURE — 93005 ELECTROCARDIOGRAM TRACING: CPT

## 2020-01-03 PROCEDURE — 84484 ASSAY OF TROPONIN QUANT: CPT

## 2020-01-03 PROCEDURE — 93010 ELECTROCARDIOGRAM REPORT: CPT

## 2020-01-03 NOTE — ER DOCUMENT REPORT
ED Medical Screen (RME)





- General


Chief Complaint: Breathing Difficulty


Stated Complaint: DIFFICULTY BREATHING


Time Seen by Provider: 01/03/20 10:17


Notes: 





69-year-old male with COPD, hypertension, diabetes presents to the emergency 

department with shortness of breath progressively worse over the past 2 weeks.  

Patient states that he has had a cough and congestion and is now having 

difficulty breathing.  Patient is on oxygen 2 L while at home at night and when 

he is out in town.  O2 sats 89 to 90% on 2 L here in triage.  Denies fevers or 

chills, denies chest pain, states that he has chest tightness and cannot catch 

his breath





Exam: Nontoxic-appearing in mild distress, increased work of breathing, 

decreased air movement with expiratory wheezing on auscultation





I have greeted and performed a rapid initial assessment of this patient.  A 

comprehensive ED assessment and evaluation of the patient, analysis of test 

results and completion of medical decision making process will be conducted by 

an additional ED providers.


TRAVEL OUTSIDE OF THE [place] IN LAST 30 DAYS: No





- Related Data


Allergies/Adverse Reactions: 


                                        





diphenhydramine [From Benadryl] Adverse Reaction (Intermediate, Verified 

01/03/20 10:17)


   


ibuprofen [From Motrin] Adverse Reaction (Intermediate, Verified 01/03/20 10:17)


   Bleeding








Home Medications: loratadine.  norvasc.  atorvastatin.  tylenol.  flomax.  

janumet.  spiriva.  mucinex.  vitamin d3.  finastride.  lisinopril.  symbicort





Past Medical History





- Past Medical History


Cardiac Medical History: Reports: Hx Hypertension


Pulmonary Medical History: Reports: Hx Asthma


Endocrine Medical History: Reports: Hx Diabetes Mellitus Type 2


Renal/ Medical History: Denies: Hx Peritoneal Dialysis


Past Surgical History: Reports: Other - Segmental colectomy in the 1990s for 

colon cancer.





- Immunizations


Hx Diphtheria, Pertussis, Tetanus Vaccination: Yes

## 2020-01-03 NOTE — ER DOCUMENT REPORT
Entered by PRISCILLA MCKEON SCRIBE  20 1446 





Acting as scribe for:ALVARO WALKER IV, MD





ED General





- General


Chief Complaint: Breathing Difficulty


Stated Complaint: DIFFICULTY BREATHING


Time Seen by Provider: 20 10:17


Mode of Arrival: Ambulatory


Information source: Patient


Notes: 





This 69 year old male patient with a history of COPD presents to the ED today 

with complaints of shortness of breath for the past x2 weeks. Patient reports 

cough, congestion, dyspnea, and chest tightness, but denies fever or chills. 

Patient states that his symptoms have progressively worsened since onset. 

Patient notes that he is on 2L of O2 at home at night. 


TRAVEL OUTSIDE OF THE [place] IN LAST 30 DAYS: No





- Related Data


Allergies/Adverse Reactions: 


                                        





diphenhydramine [From Benadryl] Adverse Reaction (Intermediate, Verified 

20 10:17)


   


ibuprofen [From Motrin] Adverse Reaction (Intermediate, Verified 20 10:17)


   Bleeding








Home Medications: loratadine.  norvasc.  atorvastatin.  tylenol.  flomax.  

janumet.  spiriva.  mucinex.  vitamin d3.  finastride.  lisinopril.  symbicort





Past Medical History





- General


Information source: Patient, [facility] Records





- Social History


Smoking Status: Former Smoker


Cigarette use (# per day): No


Chew tobacco use (# tins/day): No


Smoking Education Provided: No


Family History: Reviewed & Not Pertinent


Patient has suicidal ideation: No


Patient has homicidal ideation: No





- Past Medical History


Cardiac Medical History: Reports: Hx Hypertension


Pulmonary Medical History: Reports: Hx Asthma


Endocrine Medical History: Reports: Hx Diabetes Mellitus Type 2


Past Surgical History: Reports: Other - Segmental colectomy in the  for 

colon cancer.





- Immunizations


Hx Diphtheria, Pertussis, Tetanus Vaccination: Yes





Review of Systems





- Review of Systems


Constitutional: See HPI.  denies: Chills, Fever


EENT: No symptoms reported


Cardiovascular: See HPI, Dyspnea, Other - Chest tightness


Respiratory: See HPI, Cough, Short of breath, Other - Congestion


Gastrointestinal: No symptoms reported


Genitourinary: No symptoms reported


Male Genitourinary: No symptoms reported


Musculoskeletal: No symptoms reported


Skin: No symptoms reported


Hematologic/Lymphatic: No symptoms reported


Neurological/Psychological: No symptoms reported


-: Yes All other systems reviewed and negative





Physical Exam





- Vital signs


Vitals: 


                                        











Resp Pulse Ox


 


 20   93 


 


 20 11:24  20 11:24











Interpretation: Normal





- General


General appearance: Appears well, Alert





- HEENT


Head: Normocephalic, Atraumatic


Eyes: Normal


Pupils: PERRL





- Respiratory


Respiratory status: No respiratory distress


Chest status: Nontender


Breath sounds: Wheezing - In bilateral lung bases, Other - Upper lung fields are

clear bilaterally


Chest palpation: Normal





- Cardiovascular


Rhythm: Regular


Heart sounds: Normal auscultation


Murmur: No





- Abdominal


Inspection: Normal


Distension: No distension


Bowel sounds: Normal


Tenderness: Nontender - Abdomen soft


Organomegaly: No organomegaly





- Back


Back: Normal, Nontender





- Extremities


General upper extremity: Normal inspection


General lower extremity: Normal inspection





- Neurological


Neuro grossly intact: Yes





- Psychological


Associated symptoms: Normal affect, Normal mood





- Skin


Skin Temperature: Warm


Skin Moisture: Dry


Skin Color: Normal





Course





- Re-evaluation


Re-evalutation: 





20 15:08


Patient states he feels better compared to presentation.  Patient states he 

feels like his breathing is improved.


20 15:11


Patient is requesting a prescription for Nexium for his reflux





- Vital Signs


Vital signs: 


                                        











Temp Pulse Resp BP Pulse Ox


 


       27 H  144/80 H  92 


 


       20 13:01  20 13:00  20 13:01














- Laboratory


Result Diagrams: 


                                 20 10:34





                                 20 10:34


Laboratory results interpreted by me: 


                                        











  20





  10:34 10:34 12:15


 


RBC  4.20 L  


 


Hgb  12.8 L  


 


RDW  14.7 H  


 


Lymph % (Auto)  9.3 L  


 


Carbon Dioxide   32 H 


 


Creatinine   1.26 H 


 


Est GFR (MDRD) Non-Af   57 L 


 


Calcium   10.4 H 


 


Urine Protein    100 H


 


Urine Blood    MODERATE H


 


Ur Leukocyte Esterase    MODERATE H














- Diagnostic Test


Radiology reviewed: Reports reviewed





- EKG Interpretation by Me


Additional EKG results interpreted by me: 





20 15:09


EKG obtained on 1/3/2020 at 1124 hrs. was interpreted by this MD.  Findings: 

Sinus tachycardia, rate 103, P waves proceed QRS complexes QRS complexes appear 

narrow, there are no obvious ST segment elevations or depressions to suggest 

myocardial ischemia or injury.  Impression sinus tachycardia with nonspecific ST

segments.





Discharge





- Discharge


Clinical Impression: 


 COPD exacerbation





Condition: Good


Disposition: HOME, SELF-CARE


Additional Instructions: 


Return to the Emergency Department without delay if any worse.











HOME CARE INSTRUCTIONS & INFORMATION:  Thank you for choosing us for your 

medical needs. We hope you're satisfied with the care you received.  After you 

leave, you must properly care for your problem and, at the same time, observe 

its progress.  Any condition can change.  Some illnesses can change rapidly over

hours or days.  If your condition worsens, return to the Emergency Department or

see your physician promptly.





ABOUT YOUR X-RAYS AND EKG'S:   If you had an EKG or X-rays taken, they have been

read by the Emergency Physician. The X-rays and EKG's will also be read by a 

Radiologist or Cardiologist within 24 hours.  If discrepancies are noted, you 

will be notified by telephone.  Please be certain the ED has a correct telephone

number & address where you can be reached.  Also, realize that some fractures or

abnormalities do not show up on initial X-rays.  If your symptoms continue, see 

your physician.





ABOUT YOUR LABORATORY TEST:   If you had laboratory tests, the results have been

reviewed by the Emergency Physician.  Some test results (for example cultures) 

may not be available for several days.  You will be contacted if any test result

shows you need additional treatment.  Please be certain the ED has a correct 

telephone number and address where you can be reached.





ABOUT YOUR MEDICATIONS:  You will receive instructions on how to take your 

medicine on the prescription label you receive.  Additional information may be 

provided by the Pharmacy.  If you have questions afterwards, call the ED for 

clarification or further instructions.  Some prescribed medications may cause 

drowsiness.  Do not perform tasks such as driving a car or operating machinery 

without consulting your Pharmacist.  If you feel you need a refill of pain 

medication, your condition will need re-evaluation.  Please do not call for a r

efill of any medication.





ABOUT YOUR SIGNATURE:   Signature of this document acknowledges to followin. Understanding that you received emergency treatment and that you may be 

   released before al medical problems are known or treated. Please be certain  

   the ED has a correct phone number & address where you can be reached.


   2. Acknowledgement that you will arrange for follow-up care as recommended.


   3. Authorization for the Emergency Physician to provide information to your 

follow-up Physician in order to maximize your care.





AT ANY TIME, IF YOUR SYMPTOMS CHANGE SIGNIFICANTLY OR WORSEN OR YOU DEVELOP NEW 

SYMPTOMS, RETURN TO THE EMERGENCY DEPARTMENT IMMEDIATELY FOR RE-EVALUATION.





OUR GOAL IS TO PROVIDE EXCELLENT MEDICAL CARE!





WE HOPE THAT WE HAVE MET YOUR EXPECTATIONS DURING YOUR EMERGENCY DEPARTMENT 

VISIT AND THAT YOU FEEL YOU HAVE RECEIVED EXCELLENT CARE!








Chronic Obstructive Lung Disease





     You have chronic obstructive lung disease (COPD).  The symptoms come from 

emphysema (damage to small airways, with trapping of air in large sacks in the 

lung) and chronic bronchitis (repeated infection and damage to larger airways). 

The cause is almost always cigarette smoking, although dust exposure, asthma, 

and infections contribute.


     You should avoid fumes, dust, and smoke (especially tobacco smoke).  Your 

condition will flare from time to time.  There is no cure, but the symptoms can 

be treated.


     Bronchodilators (asthma medicine) are often helpful.  Antibiotics help when

infection is present.  When shortness of breath is severe, we may prescribe 

cortisone medication.  If medicine doesn't help enough, we can arrange for you 

to have an oxygen tank at home.


     Notify your doctor at once if sputum becomes thick, foul, or bloody, if you

develop a fever or chest pain, or if your shortness of breath worsens.


Prescriptions: 


Prednisone [Deltasone 20 mg Tablet] 3 tab PO DAILY 4 Days #12 tablet


Esomeprazole Magnesium [Nexium 24Hr] 20 mg PO DAILY #30 capsule.dr LOUIS personally performed the services described in the documentation, reviewed and

edited the documentation which was dictated to the scribe in my presence, and it

accurately records my words and actions.

## 2020-01-03 NOTE — RADIOLOGY REPORT (SQ)
EXAM DESCRIPTION:  CHEST 2 VIEWS



COMPLETED DATE/TIME:  1/3/2020 11:29 am



REASON FOR STUDY:  COPD exacerbation



COMPARISON:  None.



EXAM PARAMETERS:  NUMBER OF VIEWS: two views

TECHNIQUE: Digital Frontal and Lateral radiographic views of the chest acquired.

RADIATION DOSE: NA

LIMITATIONS: none



FINDINGS:  LUNGS AND PLEURA: Bilateral interstitial prominence.  No focal consolidation.  No large ef
fusion.  No pneumothorax.

MEDIASTINUM AND HILAR STRUCTURES: No discrete masses P

HEART AND VASCULAR STRUCTURES: Central vascular congestion.  Normal heart size.  Ectatic thoracic aor
ta.

BONES: No acute findings.

HARDWARE: None in the chest.

OTHER: No other significant finding.



IMPRESSION:  Bilateral interstitial prominence possibly secondary to edema versus fibrotic change.

No focal consolidation.  No significant effusion.



TECHNICAL DOCUMENTATION:  JOB ID:  [id number]

 2011 [employer]- All Rights Reserved



Reading location - IP/workstation name: [email or web address]

## 2020-01-03 NOTE — EKG REPORT
SEVERITY:- ABNORMAL ECG -

SINUS TACHYCARDIA



POOR R WAVE PROGRESSION , CONSIDER LEAD PLACEMENT ERROR

NONSPECIFIC INFERIOR ST-T CHANGES

:

Confirmed by: Albin San MD 03-Jan-2020 19:23:38

## 2020-05-06 ENCOUNTER — HOSPITAL ENCOUNTER (INPATIENT)
Dept: HOSPITAL 62 - ER | Age: 70
LOS: 3 days | Discharge: HOME | DRG: 189 | End: 2020-05-09
Attending: INTERNAL MEDICINE | Admitting: INTERNAL MEDICINE
Payer: MEDICARE

## 2020-05-06 DIAGNOSIS — Z83.3: ICD-10-CM

## 2020-05-06 DIAGNOSIS — I10: ICD-10-CM

## 2020-05-06 DIAGNOSIS — Z87.891: ICD-10-CM

## 2020-05-06 DIAGNOSIS — Z88.8: ICD-10-CM

## 2020-05-06 DIAGNOSIS — Z90.49: ICD-10-CM

## 2020-05-06 DIAGNOSIS — Z79.84: ICD-10-CM

## 2020-05-06 DIAGNOSIS — Z99.81: ICD-10-CM

## 2020-05-06 DIAGNOSIS — Z82.49: ICD-10-CM

## 2020-05-06 DIAGNOSIS — J44.1: ICD-10-CM

## 2020-05-06 DIAGNOSIS — J96.21: Primary | ICD-10-CM

## 2020-05-06 DIAGNOSIS — Z85.038: ICD-10-CM

## 2020-05-06 DIAGNOSIS — E66.01: ICD-10-CM

## 2020-05-06 DIAGNOSIS — Z88.6: ICD-10-CM

## 2020-05-06 DIAGNOSIS — E11.9: ICD-10-CM

## 2020-05-06 LAB
A TYPE INFLUENZA AG: NEGATIVE
ABSOLUTE LYMPHOCYTES# (MANUAL): 0.6 10^3/UL (ref 0.5–4.7)
ABSOLUTE MONOCYTES # (MANUAL): 0.2 10^3/UL (ref 0.1–1.4)
ADD MANUAL DIFF: YES
ALBUMIN SERPL-MCNC: 4.4 G/DL (ref 3.5–5)
ALP SERPL-CCNC: 77 U/L (ref 38–126)
ANION GAP SERPL CALC-SCNC: 12 MMOL/L (ref 5–19)
APPEARANCE UR: CLEAR
APTT BLD: 29.9 SEC (ref 23.5–35.8)
APTT PPP: YELLOW S
AST SERPL-CCNC: 47 U/L (ref 17–59)
B INFLUENZA AG: NEGATIVE
BASE EXCESS BLDV CALC-SCNC: -2.8 MMOL/L
BASOPHILS NFR BLD MANUAL: 0 % (ref 0–2)
BILIRUB DIRECT SERPL-MCNC: 0 MG/DL (ref 0–0.4)
BILIRUB SERPL-MCNC: 0.5 MG/DL (ref 0.2–1.3)
BILIRUB UR QL STRIP: NEGATIVE
BUN SERPL-MCNC: 22 MG/DL (ref 7–20)
CALCIUM: 9.9 MG/DL (ref 8.4–10.2)
CHLORIDE SERPL-SCNC: 104 MMOL/L (ref 98–107)
CK MB SERPL-MCNC: 1.94 NG/ML (ref ?–4.55)
CK SERPL-CCNC: 255 U/L (ref 55–170)
CO2 SERPL-SCNC: 24 MMOL/L (ref 22–30)
D DIMER PPP FEU-MCNC: 0.82 UG/ML (ref 0–0.5)
EOSINOPHIL NFR BLD MANUAL: 0 % (ref 0–6)
ERYTHROCYTE [DISTWIDTH] IN BLOOD BY AUTOMATED COUNT: 15 % (ref 11.5–14)
GLUCOSE SERPL-MCNC: 171 MG/DL (ref 75–110)
GLUCOSE UR STRIP-MCNC: 50 MG/DL
HCO3 BLDV-SCNC: 24.7 MMOL/L (ref 20–32)
HCT VFR BLD CALC: 38.5 % (ref 37.9–51)
HGB BLD-MCNC: 12.9 G/DL (ref 13.5–17)
INR PPP: 1.01
KETONES UR STRIP-MCNC: NEGATIVE MG/DL
MCH RBC QN AUTO: 30.8 PG (ref 27–33.4)
MCHC RBC AUTO-ENTMCNC: 33.4 G/DL (ref 32–36)
MCV RBC AUTO: 92 FL (ref 80–97)
MONOCYTES % (MANUAL): 2 % (ref 3–13)
NITRITE UR QL STRIP: NEGATIVE
NT PRO BNP: 286 PG/ML (ref ?–125)
PCO2 BLDV: 54.5 MMHG (ref 35–63)
PH BLDV: 7.27 [PH] (ref 7.3–7.42)
PH UR STRIP: 5 [PH] (ref 5–9)
PLATELET # BLD: 277 10^3/UL (ref 150–450)
PLATELET CLUMP BLD QL SMEAR: PRESENT
PLATELET COMMENT: ADEQUATE
POTASSIUM SERPL-SCNC: 4.5 MMOL/L (ref 3.6–5)
PROT SERPL-MCNC: 7.5 G/DL (ref 6.3–8.2)
PROT UR STRIP-MCNC: >=500 MG/DL
PROTHROMBIN TIME: 13.3 SEC (ref 11.4–15.4)
RBC # BLD AUTO: 4.18 10^6/UL (ref 4.35–5.55)
RBC MORPH BLD: (no result)
SEGMENTED NEUTROPHILS % (MAN): 92 % (ref 42–78)
SP GR UR STRIP: 1.01
TOTAL CELLS COUNTED BLD: 100
TROPONIN I SERPL-MCNC: 0.03 NG/ML
UROBILINOGEN UR-MCNC: NEGATIVE MG/DL (ref ?–2)
VARIANT LYMPHS NFR BLD MANUAL: 6 % (ref 13–45)
WBC # BLD AUTO: 9.8 10^3/UL (ref 4–10.5)

## 2020-05-06 PROCEDURE — 93005 ELECTROCARDIOGRAM TRACING: CPT

## 2020-05-06 PROCEDURE — 94667 MNPJ CHEST WALL 1ST: CPT

## 2020-05-06 PROCEDURE — 84443 ASSAY THYROID STIM HORMONE: CPT

## 2020-05-06 PROCEDURE — 82553 CREATINE MB FRACTION: CPT

## 2020-05-06 PROCEDURE — 5A09457 ASSISTANCE WITH RESPIRATORY VENTILATION, 24-96 CONSECUTIVE HOURS, CONTINUOUS POSITIVE AIRWAY PRESSURE: ICD-10-PCS | Performed by: INTERNAL MEDICINE

## 2020-05-06 PROCEDURE — 80053 COMPREHEN METABOLIC PANEL: CPT

## 2020-05-06 PROCEDURE — 82803 BLOOD GASES ANY COMBINATION: CPT

## 2020-05-06 PROCEDURE — 85610 PROTHROMBIN TIME: CPT

## 2020-05-06 PROCEDURE — 87880 STREP A ASSAY W/OPTIC: CPT

## 2020-05-06 PROCEDURE — 83880 ASSAY OF NATRIURETIC PEPTIDE: CPT

## 2020-05-06 PROCEDURE — 99285 EMERGENCY DEPT VISIT HI MDM: CPT

## 2020-05-06 PROCEDURE — 81001 URINALYSIS AUTO W/SCOPE: CPT

## 2020-05-06 PROCEDURE — 87804 INFLUENZA ASSAY W/OPTIC: CPT

## 2020-05-06 PROCEDURE — 84484 ASSAY OF TROPONIN QUANT: CPT

## 2020-05-06 PROCEDURE — 85025 COMPLETE CBC W/AUTO DIFF WBC: CPT

## 2020-05-06 PROCEDURE — 82550 ASSAY OF CK (CPK): CPT

## 2020-05-06 PROCEDURE — 94640 AIRWAY INHALATION TREATMENT: CPT

## 2020-05-06 PROCEDURE — 94660 CPAP INITIATION&MGMT: CPT

## 2020-05-06 PROCEDURE — 87040 BLOOD CULTURE FOR BACTERIA: CPT

## 2020-05-06 PROCEDURE — 87070 CULTURE OTHR SPECIMN AEROBIC: CPT

## 2020-05-06 PROCEDURE — 71045 X-RAY EXAM CHEST 1 VIEW: CPT

## 2020-05-06 PROCEDURE — 82962 GLUCOSE BLOOD TEST: CPT

## 2020-05-06 PROCEDURE — 85730 THROMBOPLASTIN TIME PARTIAL: CPT

## 2020-05-06 PROCEDURE — 83036 HEMOGLOBIN GLYCOSYLATED A1C: CPT

## 2020-05-06 PROCEDURE — 87635 SARS-COV-2 COVID-19 AMP PRB: CPT

## 2020-05-06 PROCEDURE — 71275 CT ANGIOGRAPHY CHEST: CPT

## 2020-05-06 PROCEDURE — 93010 ELECTROCARDIOGRAM REPORT: CPT

## 2020-05-06 PROCEDURE — 36415 COLL VENOUS BLD VENIPUNCTURE: CPT

## 2020-05-06 PROCEDURE — 85379 FIBRIN DEGRADATION QUANT: CPT

## 2020-05-06 PROCEDURE — 83735 ASSAY OF MAGNESIUM: CPT

## 2020-05-06 PROCEDURE — 83605 ASSAY OF LACTIC ACID: CPT

## 2020-05-06 PROCEDURE — 96365 THER/PROPH/DIAG IV INF INIT: CPT

## 2020-05-06 PROCEDURE — 80061 LIPID PANEL: CPT

## 2020-05-06 RX ADMIN — INSULIN HUMAN SCH UNIT: 100 INJECTION, SOLUTION PARENTERAL at 22:07

## 2020-05-06 RX ADMIN — MAGNESIUM SULFATE IN DEXTROSE SCH MLS/HR: 10 INJECTION, SOLUTION INTRAVENOUS at 13:35

## 2020-05-06 RX ADMIN — HEPARIN SODIUM SCH UNIT: 5000 INJECTION, SOLUTION INTRAVENOUS; SUBCUTANEOUS at 22:10

## 2020-05-06 RX ADMIN — SODIUM CHLORIDE PRN MLS/HR: 9 INJECTION, SOLUTION INTRAVENOUS at 21:57

## 2020-05-06 RX ADMIN — METOPROLOL TARTRATE SCH MG: 25 TABLET, FILM COATED ORAL at 22:09

## 2020-05-06 RX ADMIN — LEVOFLOXACIN SCH MLS/HR: 750 INJECTION, SOLUTION INTRAVENOUS at 19:30

## 2020-05-06 RX ADMIN — MAGNESIUM SULFATE IN DEXTROSE SCH MLS/HR: 10 INJECTION, SOLUTION INTRAVENOUS at 16:18

## 2020-05-06 RX ADMIN — PANTOPRAZOLE SODIUM SCH MG: 40 TABLET, DELAYED RELEASE ORAL at 22:09

## 2020-05-06 RX ADMIN — METHYLPREDNISOLONE SODIUM SUCCINATE SCH MG: 40 INJECTION, POWDER, FOR SOLUTION INTRAMUSCULAR; INTRAVENOUS at 22:03

## 2020-05-06 NOTE — EKG REPORT
SEVERITY:- ABNORMAL ECG -

SINUS TACHYCARDIA

MULTIPLE ATRIAL PREMATURE COMPLEXES

:

Confirmed by: Albin San MD 06-May-2020 19:50:38

## 2020-05-06 NOTE — EKG REPORT
SEVERITY:- BORDERLINE ECG -

SINUS TACHYCARDIA

CONSIDER OLD ANTEROSEPTAL MI

:

Confirmed by: Albin San MD 06-May-2020 19:51:55

## 2020-05-06 NOTE — RADIOLOGY REPORT (SQ)
EXAM DESCRIPTION:  CHEST SINGLE VIEW



IMAGES COMPLETED DATE/TIME:  5/6/2020 1:12 pm



REASON FOR STUDY:  bed 6 difficulty breathing



COMPARISON:  1/3/2020, CT 1/14/2020



EXAM PARAMETERS:  NUMBER OF VIEWS: One view.

TECHNIQUE: Single frontal radiographic view of the chest acquired.

RADIATION DOSE: NA

LIMITATIONS: None.



FINDINGS:  LUNGS AND PLEURA: Stable course bilateral reticulonodular interstitial opacities.  No foca
l consolidation.  No pleural effusion or pneumothorax.

MEDIASTINUM AND HILAR STRUCTURES: No masses.  Contour normal.

HEART AND VASCULAR STRUCTURES: Enlarged, stable.  Ectatic thoracic aorta.

BONES: No acute findings.

HARDWARE: None in the chest.

OTHER: No other significant finding.



IMPRESSION:  Stable coarse bilateral interstitial opacities, likely secondary to interstitial lung di
sease.

No definitive superimposed cardiopulmonary process.



TECHNICAL DOCUMENTATION:  JOB ID:  [id number]

 2011 [employer]- All Rights Reserved



Reading location - IP/workstation name: [email or web address]

## 2020-05-06 NOTE — ER DOCUMENT REPORT
Entered by KIYA ATYLOR SCRIBE  05/06/20 0690 





Acting as scribe for:NIEVES BORJAS MD





ED Respiratory Problem





- General


Chief Complaint: Breathing Difficulty


Stated Complaint: DIFFICULTY BREATHING


Time Seen by Provider: 05/06/20 12:35


Information source: Patient


Notes: 





This 69 year old male patient presents to the emergency department today with 

complaints of shortness of breath. Patient states his air conditioner stopped 

working x3 days ago and he has had shortness of breath since. Patient states he 

is on oxygen only during the night. Patient states he has a history of COPD and 

had more difficulty breathing this morning so he called EMS. Patient denies any 

fever, chills, or sore throat. 


TRAVEL OUTSIDE OF THE [place] IN LAST 30 DAYS: No





- Related Data


Allergies/Adverse Reactions: 


                                        





diphenhydramine [From Benadryl] Adverse Reaction (Intermediate, Verified 

01/03/20 10:17)


   


ibuprofen [From Motrin] Adverse Reaction (Intermediate, Verified 01/03/20 10:17)


   Bleeding











Past Medical History





- General


Information source: Patient





- Social History


Smoking Status: Former Smoker


Cigarette use (# per day): No


Chew tobacco use (# tins/day): No


Frequency of alcohol use: None


Drug Abuse: None


Family History: Reviewed & Not Pertinent


Patient has homicidal ideation: No





- Past Medical History


Cardiac Medical History: Reports: Hx Hypertension


Pulmonary Medical History: Reports: Hx Asthma, Hx COPD


Endocrine Medical History: Reports: Hx Diabetes Mellitus Type 2


Past Surgical History: Reports: Hx Abdominal Surgery, Hx Bowel Surgery - colon 

surgery, Other - Segmental colectomy in the 1990s for colon cancer.





- Immunizations


Hx Diphtheria, Pertussis, Tetanus Vaccination: Yes





Review of Systems





- Review of Systems


Constitutional: See HPI.  denies: Chills, Fever


EENT: See HPI.  denies: Throat pain


Cardiovascular: No symptoms reported


Respiratory: See HPI, Short of breath


Gastrointestinal: No symptoms reported


Genitourinary: No symptoms reported


Male Genitourinary: No symptoms reported


Musculoskeletal: No symptoms reported


Skin: No symptoms reported


Hematologic/Lymphatic: No symptoms reported


Neurological/Psychological: No symptoms reported


-: Yes All other systems reviewed and negative





Physical Exam





- Vital signs


Vitals: 


                                        











Resp Pulse Ox


 


 28 H  94 


 


 05/06/20 12:31  05/06/20 12:31














- General


General appearance: Appears well, Alert





- HEENT


Head: Normocephalic, Atraumatic


Eyes: Normal


Pupils: PERRL


Nasal: Other - Nasal cannula in place by EMS.





- Respiratory


Chest status: Nontender


Breath sounds: Other - Diminished breath sounds..  No: Wheezing


Chest palpation: Normal





- Cardiovascular


Rhythm: Tachycardia


Heart sounds: Normal auscultation, S1 appreciated, S2 appreciated


Murmur: No





- Abdominal


Inspection: Obese


Distension: No distension


Bowel sounds: Normal


Tenderness: Nontender





- Extremities


General upper extremity: Normal inspection.  No: Edema


General lower extremity: Normal inspection.  No: Edema





- Neurological


Neuro grossly intact: Yes


Cognition: Normal


Orientation: AAOx4





- Psychological


Associated symptoms: Normal affect, Normal mood





- Skin


Skin Temperature: Warm


Skin Moisture: Dry


Skin Color: Normal





Course





- Re-evaluation


Re-evalutation: 





05/06/20 16:32


Patient is resting comfortable now on BiPAP to maintain his saturation above 

90%.





- Vital Signs


Vital signs: 


                                        











Temp Pulse Resp BP Pulse Ox


 


 98.3 F   123 H  28 H  152/84 H  97 


 


 05/06/20 12:40  05/06/20 13:08  05/06/20 16:01  05/06/20 16:00  05/06/20 16:01











05/06/20 16:33


Vital signs shows sinus tachycardia increased respiratory rate of 28 blood 

pressure systolic hypertension.  Pulse ox is registering 97%.  This is on BiPAP.





- Laboratory


Result Diagrams: 


                                 05/06/20 12:55





                                 05/06/20 12:55


Laboratory results interpreted by me: 


                                        











  05/06/20 05/06/20 05/06/20





  12:55 12:55 12:55


 


RBC  4.18 L  


 


Hgb  12.9 L  


 


RDW  15.0 H  


 


Seg Neuts % (Manual)  92 H  


 


Lymphocytes % (Manual)  6 L  


 


Monocytes % (Manual)  2 L  


 


Abs Neuts (Manual)  9.0 H  


 


D-Dimer   


 


VBG pH   


 


BUN   22 H 


 


Est GFR (MDRD) Non-Af   59 L 


 


Glucose   171 H 


 


Lactic Acid   


 


ALT   71 H 


 


Creatine Kinase   255 H 


 


NT-Pro-B Natriuret Pep    286 H


 


Urine Protein   


 


Urine Glucose (UA)   


 


Urine Blood   














  05/06/20 05/06/20 05/06/20





  12:55 12:55 12:55


 


RBC   


 


Hgb   


 


RDW   


 


Seg Neuts % (Manual)   


 


Lymphocytes % (Manual)   


 


Monocytes % (Manual)   


 


Abs Neuts (Manual)   


 


D-Dimer    0.82 H


 


VBG pH  7.27 L  


 


BUN   


 


Est GFR (MDRD) Non-Af   


 


Glucose   


 


Lactic Acid   4.0 H 


 


ALT   


 


Creatine Kinase   


 


NT-Pro-B Natriuret Pep   


 


Urine Protein   


 


Urine Glucose (UA)   


 


Urine Blood   














  05/06/20





  14:11


 


RBC 


 


Hgb 


 


RDW 


 


Seg Neuts % (Manual) 


 


Lymphocytes % (Manual) 


 


Monocytes % (Manual) 


 


Abs Neuts (Manual) 


 


D-Dimer 


 


VBG pH 


 


BUN 


 


Est GFR (MDRD) Non-Af 


 


Glucose 


 


Lactic Acid 


 


ALT 


 


Creatine Kinase 


 


NT-Pro-B Natriuret Pep 


 


Urine Protein  >=500 H


 


Urine Glucose (UA)  50 H


 


Urine Blood  MODERATE H








Laboratories show blood sugar elevation in the patient who is a diabetic who 

received IV Solu-Medrol in route today.





- Diagnostic Test


Radiology reviewed: Image reviewed, Reports reviewed


Radiology results interpreted by me: 





05/06/20 16:35


Chest x-ray shows cardiomegaly and diffuse interstitial lung markings as read by

radiologist as chronic interstitial lung disease with no new changes





- EKG Interpretation by Me


Additional EKG results interpreted by me: 





05/06/20 16:36


Twelve-lead EKG shows sinus tachycardia at 122 no other acute ST-T wave changes.





Discharge





- Discharge


Clinical Impression: 


 Interstitial lung disease, Diabetes mellitus type 2 in obese, COPD with acute 

exacerbation, Hypoxemia





Condition: Fair


Disposition: ADMITTED AS INPATIENT


Admitting Provider: Nahid (Hospitalist)


Unit Admitted: [facility]





I personally performed the services described in the documentation, reviewed and

edited the documentation which was dictated to the scribe in my presence, and it

accurately records my words and actions.

## 2020-05-06 NOTE — RADIOLOGY REPORT (SQ)
EXAM DESCRIPTION:  CTA CHEST



IMAGES COMPLETED DATE/TIME:  5/6/2020 6:56 pm



REASON FOR STUDY:  r/o PE



COMPARISON:  1/14/2017



TECHNIQUE:  CT scan of the chest performed using helical scanning technique with dynamic intravenous 
contrast injection.  Images reviewed with lung, soft tissue and bone windows.  Reconstructed coronal 
and sagittal MPR images reviewed.

Additional 3 dimensional post-processing performed to develop Maximal Intensity Projection images (MI
P).  All images stored on PACS.

All CT scanners at this facility use dose modulation, iterative reconstruction, and/or weight based d
osing when appropriate to reduce radiation dose to as low as reasonably achievable (ALARA).

[facility]: Dose Right  [facility]: CareDose    [facility]: Dose Right    [facility]: Teradose 4D    [facility]: Smart Technologies



CONTRAST TYPE AND DOSE:  contrast/concentration: Isovue 350.00 mg/ml; Total Contrast Delivered: 68.0 
ml; Total Saline Delivered: 57.0 ml

Contrast bolus adequate for pulmonary arteries and aorta.



RENAL FUNCTION:  GFR > 60.



RADIATION DOSE:  CT Rad equipment meets quality standard of care and radiation dose reduction techniq
ues were employed. CTDIvol: 19.8 - 39.6 mGy. DLP: 1658 mGy-cm. .



LIMITATIONS:  None.



FINDINGS:  LUNGS AND PLEURA: Chronic interstitial lung disease with diffuse interlobular septal thick
ening.  Centrilobular emphysematous changes in the upper lobes.  7 mm part solid nodule right lower l
obe image 72.  No significant pleural fluid.

AORTA AND GREAT VESSELS: No aneurysm. No dissection.

HEART: No pericardial effusion.

PULMONARY ARTERIES: No emboli visualized in the main pulmonary arteries or the segmental branches.

HILAR AND MEDIASTINAL STRUCTURES: 1.5 x 3.0 right hilar node, unchanged.

HARDWARE: None in the chest.

UPPER ABDOMEN: No significant findings.  Limited exam.

THYROID AND OTHER SOFT TISSUES: No masses.  No adenopathy.

BONES: No acute or significant finding.

3D MIPS: Confirm above findings.

OTHER: No other significant finding.



IMPRESSION:

1. No PE.

2. Chronic interstitial lung disease.  Right lower lobe pulmonary nodule.



COMMENT:  Quality ID # 436: Final reports with documentation of one or more dose reduction techniques
 (e.g., Automated exposure control, adjustment of the mA and/or kV according to patient size, use of 
iterative reconstruction technique)



TECHNICAL DOCUMENTATION:  JOB ID:  [id number]

 2011 [employer]- All Rights Reserved



Reading location - IP/workstation name: [email or web address]

## 2020-05-06 NOTE — PDOC H&P
History of Present Illness


Admission Date/PCP: 


5/6/2020


Patient complains of: Came to the emergency room with complaints of increasing 

shortness of breath


History of Present Illness: 


ERIN MORENO is a 69 year old male with history of COPD on 2 L of oxygen, type 

2 diabetes mellitus, morbid obesity, history of colon cancer status post colon 

resection in 1995, ex-smoker stopped smoking 4 years ago came to the emergency 

room with complaining of increasing shortness of breath.  Shortness of breath 

associated with occasional cough and continuous wheezing.  Denies any fevers 

denies any nausea vomiting diarrhea or abdominal pain.  Denies any headaches 

dizzy spells.  Denies any chest pains.  Patient required BiPAP in the emergency 

room because for tachypnea and pulse ox is around 90% without BiPAP.  L consult 

was called for admission for COPD exacerbation and a COVID test is negative.  pt

agreed to be in the hospital for further management.








Past Medical History


Cardiac Medical History: Reports: Hypertension


Pulmonary Medical History: Reports: Asthma, Chronic Obstructive Pulmonary 

Disease (COPD)


Endocrine Medical History: Reports: Diabetes Mellitus Type 2





Past Surgical History


Past Surgical History: Reports: Other - Segmental colectomy in the 1990s for 

colon cancer.





Social History


Smoking Status: Former Smoker


Electronic Cigarette use?: No


Frequency of Alcohol Use: None


Hx Recreational Drug Use: No


Drugs: None


Hx Prescription Drug Abuse: No





- Advance Directive


Resuscitation Status: Full Code





Family History


Family History: Reviewed & Not Pertinent


Parental Family History Reviewed: Yes - Family history of hypertension and 

diabetes mellitus.


Children Family History Reviewed: Yes


Sibling(s) Family History Reviewed.: Yes





Medication/Allergy


Home Medications: 








Albuterol Sulfate [Proair HFA] 2 puff IH Q4HP PRN 01/14/17 


Atorvastatin Calcium 40 mg PO QHS 01/14/17 


Fexofenadine HCl 180 mg PO DAILY 01/14/17 


Fluticasone Propion/Salmeterol [Advair 250-50 Diskus] 1 puff IH BID 01/14/17 


Metformin HCl [Metformin HCl ER] 500 mg PO BID 01/14/17 


Montelukast Sodium 10 mg PO QHS 01/14/17 


Tamsulosin HCl 0.8 mg PO DAILY 01/14/17 


Benazepril HCl [Lotensin 20 mg Tablet] 40 mg PO QAM #60 tablet 01/16/17 


Levalbuterol HCl [Xopenex Neb 1.25 mg/3 ml Ampul] 1.25 mg NEB Q4H #60 vial.neb 

01/16/17 


Levofloxacin [Levaquin 750 mg Tablet] 750 mg PO DAILY #5 tablet 01/16/17 


Metoprolol Tartrate [Lopressor 25 mg Tablet] 25 mg PO Q12 #60 tab 01/16/17 


Prednisone [Sterapred Ds] 1 pkg PO ASDIR PRN 12 Days  tab.ds.pk 01/16/17 


Tiotropium Bromide [Spiriva Handihaler 18 mcg/dose (30 Dose)] 1 cap IH DAILY #30

capsule 01/16/17 


Diazepam [Valium 5 mg Tablet] 5 mg PO Q12H PRN #5 tablet 08/30/18 


Meclizine HCl 25 mg PO Q6H PRN #14 tab.chew 08/30/18 


Ondansetron [Zofran Odt 4 mg Tablet] 1 - 2 tab PO Q4H PRN #15 tab.rapdis 

08/30/18 


Esomeprazole Magnesium [Nexium 24Hr] 20 mg PO DAILY #30 capsule. 01/03/20 


Prednisone [Deltasone 20 mg Tablet] 3 tab PO DAILY 4 Days #12 tablet 01/03/20 








Allergies/Adverse Reactions: 


                                        





iodine Allergy (Verified 05/06/20 16:56)


   


diphenhydramine [From Benadryl] Adverse Reaction (Intermediate, Verified 

05/06/20 16:56)


   


ibuprofen [From Motrin] Adverse Reaction (Intermediate, Verified 05/06/20 16:56)


   Bleeding











Review of Systems


Constitutional: ABSENT: fatigue, fever(s), headache(s), weakness


Eyes: ABSENT: visual disturbances


Ears: ABSENT: hearing changes


Nose, Mouth, and Throat: ABSENT: sore throat


Cardiovascular: PRESENT: palpitations


Respiratory: PRESENT: cough, dyspnea


Gastrointestinal: ABSENT: coffee ground emesis, constipation, dysphagia, h

eartburn, hematochezia


Genitourinary: ABSENT: dysuria, hematuria


Musculoskeletal: ABSENT: joint swelling


Integumentary: ABSENT: lesions, pruritus, rash


Neurological: ABSENT: abnormal speech, confusion, convulsions, dizziness, focal 

weakness, frequent falls, lack of coordination


Psychiatric: ABSENT: anxiety, depression, homidical ideation, suicidal ideation


Endocrine: ABSENT: cold intolerance, heat intolerance, polydipsia, polyuria





Physical Exam


Vital Signs: 


                                        











Temp Pulse Resp BP Pulse Ox


 


 98.3 F   123 H  20   152/84 H  95 


 


 05/06/20 12:40  05/06/20 13:08  05/06/20 16:44  05/06/20 16:00  05/06/20 16:44








                                 Intake & Output











 05/05/20 05/06/20 05/07/20





 06:59 06:59 06:59


 


Intake Total   100


 


Balance   100


 


Weight   97.522 kg











General appearance: PRESENT: mild distress, morbidly obese


Head exam: PRESENT: atraumatic


Eye exam: PRESENT: PERRLA


Mouth exam: PRESENT: moist, tongue midline


Teeth exam: PRESENT: poor dentation


Neck exam: ABSENT: carotid bruit, JVD, lymphadenopathy, thyromegaly


Respiratory exam: PRESENT: decreased breath sounds, wheezes


Cardiovascular exam: PRESENT: tachycardia


GI/Abdominal exam: PRESENT: normal bowel sounds, soft.  ABSENT: distended, 

guarding, mass, organolmegaly, rebound, tenderness


Rectal exam: PRESENT: deferred


Extremities exam: PRESENT: full ROM.  ABSENT: calf tenderness, clubbing, pedal 

edema


Neurological exam: PRESENT: alert, awake, oriented to person, oriented to place,

oriented to time, oriented to situation, CN II-XII grossly intact.  ABSENT: 

motor sensory deficit


Psychiatric exam: PRESENT: appropriate affect, normal mood.  ABSENT: homicidal 

ideation, suicidal ideation





Results


Laboratory Results: 


                                        





                                 05/06/20 12:55 





                                 05/06/20 12:55 





                                        











  05/06/20 05/06/20 05/06/20





  11:51 11:51 12:55


 


WBC  Cancelled   9.8


 


RBC  Cancelled   4.18 L


 


Hgb  Cancelled   12.9 L


 


Hct  Cancelled   38.5


 


MCV  Cancelled   92


 


MCH  Cancelled   30.8


 


MCHC  Cancelled   33.4


 


RDW  Cancelled   15.0 H


 


Plt Count  Cancelled   277


 


Seg Neutrophils %  Cancelled   Not Reportable


 


VBG pH   


 


VBG pCO2   


 


VBG HCO3   


 


VBG Base Excess   


 


Sodium   Cancelled 


 


Potassium   Cancelled 


 


Chloride   Cancelled 


 


Carbon Dioxide   Cancelled 


 


Anion Gap   Cancelled 


 


BUN   Cancelled 


 


Creatinine   Cancelled 


 


Est GFR ( Amer)   Cancelled 


 


Est GFR (Non-Af Amer)   Cancelled 


 


Glucose   Cancelled 


 


Lactic Acid   


 


Calcium   Cancelled 


 


Total Bilirubin   Cancelled 


 


AST   Cancelled 


 


Alkaline Phosphatase   Cancelled 


 


Total Protein   Cancelled 


 


Albumin   Cancelled 


 


Urine Color   


 


Urine Appearance   


 


Urine pH   


 


Ur Specific Gravity   


 


Urine Protein   


 


Urine Glucose (UA)   


 


Urine Ketones   


 


Urine Blood   


 


Urine Nitrite   


 


Ur Leukocyte Esterase   


 


Urine WBC (Auto)   


 


Urine RBC (Auto)   














  05/06/20 05/06/20 05/06/20





  12:55 12:55 12:55


 


WBC   


 


RBC   


 


Hgb   


 


Hct   


 


MCV   


 


MCH   


 


MCHC   


 


RDW   


 


Plt Count   


 


Seg Neutrophils %   


 


VBG pH   7.27 L 


 


VBG pCO2   54.5 


 


VBG HCO3   24.7 


 


VBG Base Excess   -2.8 


 


Sodium  140.1  


 


Potassium  4.5  


 


Chloride  104  


 


Carbon Dioxide  24  


 


Anion Gap  12  


 


BUN  22 H  


 


Creatinine  1.21  


 


Est GFR ( Amer)  > 60  


 


Est GFR (Non-Af Amer)   


 


Glucose  171 H  


 


Lactic Acid    4.0 H


 


Calcium  9.9  


 


Total Bilirubin  0.5  


 


AST  47  


 


Alkaline Phosphatase  77  


 


Total Protein  7.5  


 


Albumin  4.4  


 


Urine Color   


 


Urine Appearance   


 


Urine pH   


 


Ur Specific Gravity   


 


Urine Protein   


 


Urine Glucose (UA)   


 


Urine Ketones   


 


Urine Blood   


 


Urine Nitrite   


 


Ur Leukocyte Esterase   


 


Urine WBC (Auto)   


 


Urine RBC (Auto)   














  05/06/20 05/06/20





  14:11 15:03


 


WBC  


 


RBC  


 


Hgb  


 


Hct  


 


MCV  


 


MCH  


 


MCHC  


 


RDW  


 


Plt Count  


 


Seg Neutrophils %  


 


VBG pH  


 


VBG pCO2  


 


VBG HCO3  


 


VBG Base Excess  


 


Sodium  


 


Potassium  


 


Chloride  


 


Carbon Dioxide  


 


Anion Gap  


 


BUN  


 


Creatinine  


 


Est GFR ( Amer)  


 


Est GFR (Non-Af Amer)  


 


Glucose  


 


Lactic Acid   1.3


 


Calcium  


 


Total Bilirubin  


 


AST  


 


Alkaline Phosphatase  


 


Total Protein  


 


Albumin  


 


Urine Color  YELLOW 


 


Urine Appearance  CLEAR 


 


Urine pH  5.0 


 


Ur Specific Gravity  1.009 


 


Urine Protein  >=500 H 


 


Urine Glucose (UA)  50 H 


 


Urine Ketones  NEGATIVE 


 


Urine Blood  MODERATE H 


 


Urine Nitrite  NEGATIVE 


 


Ur Leukocyte Esterase  NEGATIVE 


 


Urine WBC (Auto)  1 


 


Urine RBC (Auto)  2 








                                        











  05/06/20 05/06/20 05/06/20





  11:51 11:51 12:55


 


Creatine Kinase  Cancelled   255 H


 


CK-MB (CK-2)   Cancelled 


 


Troponin I   Cancelled 


 


NT-Pro-B Natriuret Pep   














  05/06/20 05/06/20





  12:55 12:55


 


Creatine Kinase  


 


CK-MB (CK-2)  1.94 


 


Troponin I  0.027 


 


NT-Pro-B Natriuret Pep  286 H  Cancelled











Impressions: 


                                        





Chest X-Ray  05/06/20 12:23


IMPRESSION:  Stable coarse bilateral interstitial opacities, likely secondary to

interstitial lung disease.


No definitive superimposed cardiopulmonary process.


 














Assessment and Plan





- Diagnosis


(1) Acute respiratory failure with hypoxia


Is this a current diagnosis for this admission?: Yes   


Plan: 


5/6/2020-patient is going to be admitted to [facility] as inpatient with a diagnosis 

of acute hypoxic respiratory failure with hypoxia.  To place him on BiPAP on as-

needed basis.  Started on DuoNeb nebulizations every 4 as needed, flutter valve 

therapy, Pulmicort therapy.  Started on IV Solu-Medrol 40 mg every 8 hours.  CT 

of the chest was requested because d-dimer is slightly elevated.  GI prophylaxis

DVT prophylaxis initiated.  Started on IV levofloxacin 750 mg daily.








(2) COPD with acute exacerbation


Is this a current diagnosis for this admission?: Yes   


Plan: 


5/6/20-patient has history of COPD on 2 L of oxygen via nasal cannula home 

presently he is on BiPAP.  On 40% oxygen pulse ox is around 96 to 97%.  Patient 

is still tachypneic and tachycardic at the time of my examination.  Started on 

DuoNeb nebulizations, Pulmicort, flutter valve therapy.  Started on prophylactic

antibiotic levofloxacin 750 mg daily.








(3) Diabetes mellitus type 2 in obese


Is this a current diagnosis for this admission?: No   


Plan: 


5/6/2020-patient has history of type 2 diabetes mellitus on metformin at home.  

To hold metformin at this time and started on insulin sliding scale before meals

and at bedtime.  Hemoglobin A1c is requested.  Diet exercise weight loss lifes

tyle modifications  discussed with the patient.  Dietary consult requested.








(4) HTN (hypertension)


Qualifiers: 


   Hypertension type: essential hypertension   Qualified Code(s): I10 - 

Essential (primary) hypertension   


Is this a current diagnosis for this admission?: No   


Plan: 


5/6/2020-patient has history of chronic essential hypertension blood pressure in

the ER is 152/84.  Patient is on benazepril 20 mg daily, metoprolol 25 mg p.o. 

daily at home.  Those medications are resumed.








(5) Morbid obesity


Is this a current diagnosis for this admission?: No   


Plan: 


5/6/2020-patient BMI is more than 35 diet exercise weight loss lifestyle 

modifications discussed with the patient.

## 2020-05-07 LAB
ABSOLUTE LYMPHOCYTES# (MANUAL): 0.5 10^3/UL (ref 0.5–4.7)
ABSOLUTE MONOCYTES # (MANUAL): 0.1 10^3/UL (ref 0.1–1.4)
ADD MANUAL DIFF: YES
ALBUMIN SERPL-MCNC: 4 G/DL (ref 3.5–5)
ALP SERPL-CCNC: 64 U/L (ref 38–126)
ANION GAP SERPL CALC-SCNC: 8 MMOL/L (ref 5–19)
ANISOCYTOSIS BLD QL SMEAR: SLIGHT
AST SERPL-CCNC: 37 U/L (ref 17–59)
BASOPHILS NFR BLD MANUAL: 0 % (ref 0–2)
BILIRUB DIRECT SERPL-MCNC: 0 MG/DL (ref 0–0.4)
BILIRUB SERPL-MCNC: 0.3 MG/DL (ref 0.2–1.3)
BUN SERPL-MCNC: 27 MG/DL (ref 7–20)
CALCIUM: 10 MG/DL (ref 8.4–10.2)
CHLORIDE SERPL-SCNC: 108 MMOL/L (ref 98–107)
CHOLEST SERPL-MCNC: 149.96 MG/DL (ref 0–200)
CO2 SERPL-SCNC: 25 MMOL/L (ref 22–30)
EOSINOPHIL NFR BLD MANUAL: 0 % (ref 0–6)
ERYTHROCYTE [DISTWIDTH] IN BLOOD BY AUTOMATED COUNT: 15.1 % (ref 11.5–14)
GLUCOSE SERPL-MCNC: 197 MG/DL (ref 75–110)
HCT VFR BLD CALC: 36.9 % (ref 37.9–51)
HGB BLD-MCNC: 12.5 G/DL (ref 13.5–17)
LDLC SERPL DIRECT ASSAY-MCNC: 72 MG/DL (ref ?–100)
MCH RBC QN AUTO: 30.8 PG (ref 27–33.4)
MCHC RBC AUTO-ENTMCNC: 33.8 G/DL (ref 32–36)
MCV RBC AUTO: 91 FL (ref 80–97)
MONOCYTES % (MANUAL): 1 % (ref 3–13)
NT PRO BNP: 518 PG/ML (ref ?–125)
PLATELET # BLD: 227 10^3/UL (ref 150–450)
PLATELET COMMENT: ADEQUATE
POTASSIUM SERPL-SCNC: 5 MMOL/L (ref 3.6–5)
PROT SERPL-MCNC: 6.7 G/DL (ref 6.3–8.2)
RBC # BLD AUTO: 4.06 10^6/UL (ref 4.35–5.55)
SEGMENTED NEUTROPHILS % (MAN): 94 % (ref 42–78)
TOTAL CELLS COUNTED BLD: 100
TRIGL SERPL-MCNC: 133 MG/DL (ref ?–150)
TROPONIN I SERPL-MCNC: 0.03 NG/ML
VARIANT LYMPHS NFR BLD MANUAL: 5 % (ref 13–45)
VLDLC SERPL CALC-MCNC: 27 MG/DL (ref 10–31)
WBC # BLD AUTO: 9.1 10^3/UL (ref 4–10.5)

## 2020-05-07 RX ADMIN — SODIUM CHLORIDE PRN MLS/HR: 9 INJECTION, SOLUTION INTRAVENOUS at 12:24

## 2020-05-07 RX ADMIN — HEPARIN SODIUM SCH UNIT: 5000 INJECTION, SOLUTION INTRAVENOUS; SUBCUTANEOUS at 13:26

## 2020-05-07 RX ADMIN — METOPROLOL TARTRATE SCH MG: 25 TABLET, FILM COATED ORAL at 05:41

## 2020-05-07 RX ADMIN — METOPROLOL TARTRATE SCH MG: 25 TABLET, FILM COATED ORAL at 18:26

## 2020-05-07 RX ADMIN — INSULIN HUMAN SCH UNIT: 100 INJECTION, SOLUTION PARENTERAL at 22:27

## 2020-05-07 RX ADMIN — LEVOFLOXACIN SCH MLS/HR: 750 INJECTION, SOLUTION INTRAVENOUS at 10:03

## 2020-05-07 RX ADMIN — METHYLPREDNISOLONE SODIUM SUCCINATE SCH MG: 40 INJECTION, POWDER, FOR SOLUTION INTRAMUSCULAR; INTRAVENOUS at 05:41

## 2020-05-07 RX ADMIN — HEPARIN SODIUM SCH UNIT: 5000 INJECTION, SOLUTION INTRAVENOUS; SUBCUTANEOUS at 05:41

## 2020-05-07 RX ADMIN — INSULIN HUMAN SCH UNIT: 100 INJECTION, SOLUTION PARENTERAL at 08:36

## 2020-05-07 RX ADMIN — BENAZEPRIL HYDROCHLORIDE SCH MG: 20 TABLET ORAL at 10:02

## 2020-05-07 RX ADMIN — PANTOPRAZOLE SODIUM SCH MG: 40 TABLET, DELAYED RELEASE ORAL at 18:26

## 2020-05-07 RX ADMIN — PANTOPRAZOLE SODIUM SCH MG: 40 TABLET, DELAYED RELEASE ORAL at 05:41

## 2020-05-07 RX ADMIN — METHYLPREDNISOLONE SODIUM SUCCINATE SCH MG: 40 INJECTION, POWDER, FOR SOLUTION INTRAMUSCULAR; INTRAVENOUS at 13:26

## 2020-05-07 RX ADMIN — INSULIN HUMAN SCH UNIT: 100 INJECTION, SOLUTION PARENTERAL at 12:21

## 2020-05-07 RX ADMIN — METHYLPREDNISOLONE SODIUM SUCCINATE SCH MG: 40 INJECTION, POWDER, FOR SOLUTION INTRAMUSCULAR; INTRAVENOUS at 22:26

## 2020-05-07 RX ADMIN — INSULIN HUMAN SCH UNIT: 100 INJECTION, SOLUTION PARENTERAL at 18:26

## 2020-05-07 RX ADMIN — HEPARIN SODIUM SCH UNIT: 5000 INJECTION, SOLUTION INTRAVENOUS; SUBCUTANEOUS at 22:29

## 2020-05-07 NOTE — PDOC PROGRESS REPORT
Subjective


Progress Note for:: 05/07/20


Subjective:: 





69 year old male with history of COPD on 2 L of oxygen, type 2 diabetes 

mellitus, morbid obesity, history of colon cancer status post colon resection in

1995, ex-smoker stopped smoking 4 years ago came to the emergency room with 

complaining of increasing shortness of breath.  Shortness of breath associated 

with occasional cough and continuous wheezing.  Denies any fevers denies any 

nausea vomiting diarrhea or abdominal pain.  Denies any headaches dizzy spells. 

Denies any chest pains.  Patient required BiPAP in the emergency room because 

for tachypnea and pulse ox is around 90% without BiPAP.  L consult was called 

for admission for COPD exacerbation and a COVID test is negative.  pt agreed to 

be in the hospital for further management.





5/7/[id number]-wunt-old male with history of COPD, ex-smoker admitted with hypoxia 

on BiPAP.  Patient is still on BiPAP this morning pulse ox is at 98% on 35% 

oxygen.  Comfortable in the bed sleeping well.  No complaints.  CT chest without

contrast was done it shows interstitial lung disease.  Presently on IV Solu-

Medrol, nebulizer treatments, and levofloxacillin.


Reason For Visit: 


COPD EXACERBATION








Physical Exam


Vital Signs: 


                                        











Temp Pulse Resp BP Pulse Ox


 


 97.4 F   91   20   147/88 H  96 


 


 05/07/20 07:50  05/07/20 08:07  05/07/20 08:07  05/07/20 07:50  05/07/20 08:07








                                 Intake & Output











 05/06/20 05/07/20 05/08/20





 06:59 06:59 06:59


 


Intake Total  350 


 


Output Total  1125 


 


Balance  -775 


 


Weight  95.9 kg 











General appearance: PRESENT: no acute distress, obese


Head exam: PRESENT: atraumatic


Eye exam: PRESENT: PERRLA


Mouth exam: PRESENT: neck supple


Neck exam: ABSENT: carotid bruit, JVD, lymphadenopathy, thyromegaly


Respiratory exam: PRESENT: decreased breath sounds


Cardiovascular exam: PRESENT: RRR.  ABSENT: diastolic murmur, rubs, systolic 

murmur


GI/Abdominal exam: PRESENT: normal bowel sounds, soft.  ABSENT: distended, 

guarding, mass, organolmegaly, rebound, tenderness


Rectal exam: PRESENT: deferred


Extremities exam: PRESENT: full ROM.  ABSENT: calf tenderness, clubbing, pedal 

edema


Neurological exam: PRESENT: alert, awake, oriented to person, oriented to place,

oriented to time, oriented to situation, CN II-XII grossly intact.  ABSENT: 

motor sensory deficit


Psychiatric exam: PRESENT: appropriate affect, normal mood.  ABSENT: homicidal 

ideation, suicidal ideation





Results


Laboratory Results: 


                                        





                                 05/07/20 03:33 





                                 05/07/20 03:33 





                                        











  05/06/20 05/06/20 05/06/20





  11:51 11:51 12:55


 


WBC  Cancelled   9.8


 


RBC  Cancelled   4.18 L


 


Hgb  Cancelled   12.9 L


 


Hct  Cancelled   38.5


 


MCV  Cancelled   92


 


MCH  Cancelled   30.8


 


MCHC  Cancelled   33.4


 


RDW  Cancelled   15.0 H


 


Plt Count  Cancelled   277


 


Seg Neutrophils %  Cancelled   Not Reportable


 


VBG pH   


 


VBG pCO2   


 


VBG HCO3   


 


VBG Base Excess   


 


Sodium   Cancelled 


 


Potassium   Cancelled 


 


Chloride   Cancelled 


 


Carbon Dioxide   Cancelled 


 


Anion Gap   Cancelled 


 


BUN   Cancelled 


 


Creatinine   Cancelled 


 


Est GFR ( Amer)   Cancelled 


 


Est GFR (Non-Af Amer)   Cancelled 


 


Glucose   Cancelled 


 


Lactic Acid   


 


Calcium   Cancelled 


 


Magnesium   


 


Total Bilirubin   Cancelled 


 


AST   Cancelled 


 


Alkaline Phosphatase   Cancelled 


 


Total Protein   Cancelled 


 


Albumin   Cancelled 


 


Triglycerides   


 


Cholesterol   


 


LDL Cholesterol Direct   


 


VLDL Cholesterol   


 


HDL Cholesterol   


 


TSH   


 


Urine Color   


 


Urine Appearance   


 


Urine pH   


 


Ur Specific Gravity   


 


Urine Protein   


 


Urine Glucose (UA)   


 


Urine Ketones   


 


Urine Blood   


 


Urine Nitrite   


 


Ur Leukocyte Esterase   


 


Urine WBC (Auto)   


 


Urine RBC (Auto)   














  05/06/20 05/06/20 05/06/20





  12:55 12:55 12:55


 


WBC   


 


RBC   


 


Hgb   


 


Hct   


 


MCV   


 


MCH   


 


MCHC   


 


RDW   


 


Plt Count   


 


Seg Neutrophils %   


 


VBG pH   7.27 L 


 


VBG pCO2   54.5 


 


VBG HCO3   24.7 


 


VBG Base Excess   -2.8 


 


Sodium  140.1  


 


Potassium  4.5  


 


Chloride  104  


 


Carbon Dioxide  24  


 


Anion Gap  12  


 


BUN  22 H  


 


Creatinine  1.21  


 


Est GFR ( Amer)  > 60  


 


Est GFR (Non-Af Amer)   


 


Glucose  171 H  


 


Lactic Acid    4.0 H


 


Calcium  9.9  


 


Magnesium   


 


Total Bilirubin  0.5  


 


AST  47  


 


Alkaline Phosphatase  77  


 


Total Protein  7.5  


 


Albumin  4.4  


 


Triglycerides   


 


Cholesterol   


 


LDL Cholesterol Direct   


 


VLDL Cholesterol   


 


HDL Cholesterol   


 


TSH   


 


Urine Color   


 


Urine Appearance   


 


Urine pH   


 


Ur Specific Gravity   


 


Urine Protein   


 


Urine Glucose (UA)   


 


Urine Ketones   


 


Urine Blood   


 


Urine Nitrite   


 


Ur Leukocyte Esterase   


 


Urine WBC (Auto)   


 


Urine RBC (Auto)   














  05/06/20 05/06/20 05/06/20





  14:11 15:03 19:13


 


WBC   


 


RBC   


 


Hgb   


 


Hct   


 


MCV   


 


MCH   


 


MCHC   


 


RDW   


 


Plt Count   


 


Seg Neutrophils %   


 


VBG pH   


 


VBG pCO2   


 


VBG HCO3   


 


VBG Base Excess   


 


Sodium   


 


Potassium   


 


Chloride   


 


Carbon Dioxide   


 


Anion Gap   


 


BUN   


 


Creatinine   


 


Est GFR ( Amer)   


 


Est GFR (Non-Af Amer)   


 


Glucose   


 


Lactic Acid   1.3  5.5 H


 


Calcium   


 


Magnesium   


 


Total Bilirubin   


 


AST   


 


Alkaline Phosphatase   


 


Total Protein   


 


Albumin   


 


Triglycerides   


 


Cholesterol   


 


LDL Cholesterol Direct   


 


VLDL Cholesterol   


 


HDL Cholesterol   


 


TSH   


 


Urine Color  YELLOW  


 


Urine Appearance  CLEAR  


 


Urine pH  5.0  


 


Ur Specific Gravity  1.009  


 


Urine Protein  >=500 H  


 


Urine Glucose (UA)  50 H  


 


Urine Ketones  NEGATIVE  


 


Urine Blood  MODERATE H  


 


Urine Nitrite  NEGATIVE  


 


Ur Leukocyte Esterase  NEGATIVE  


 


Urine WBC (Auto)  1  


 


Urine RBC (Auto)  2  














  05/06/20 05/07/20 05/07/20





  21:17 03:33 03:33


 


WBC   9.1 


 


RBC   4.06 L 


 


Hgb   12.5 L 


 


Hct   36.9 L 


 


MCV   91 


 


MCH   30.8 


 


MCHC   33.8 


 


RDW   15.1 H 


 


Plt Count   227 


 


Seg Neutrophils %   Not Reportable 


 


VBG pH   


 


VBG pCO2   


 


VBG HCO3   


 


VBG Base Excess   


 


Sodium    140.9


 


Potassium    5.0


 


Chloride    108 H


 


Carbon Dioxide    25


 


Anion Gap    8


 


BUN    27 H


 


Creatinine    1.20


 


Est GFR ( Amer)    > 60


 


Est GFR (Non-Af Amer)   


 


Glucose    197 H


 


Lactic Acid  4.1 H  


 


Calcium    10.0


 


Magnesium    2.7 H


 


Total Bilirubin    0.3


 


AST    37


 


Alkaline Phosphatase    64


 


Total Protein    6.7


 


Albumin    4.0


 


Triglycerides    133


 


Cholesterol    149.96


 


LDL Cholesterol Direct    72


 


VLDL Cholesterol    27.0


 


HDL Cholesterol    58


 


TSH   


 


Urine Color   


 


Urine Appearance   


 


Urine pH   


 


Ur Specific Gravity   


 


Urine Protein   


 


Urine Glucose (UA)   


 


Urine Ketones   


 


Urine Blood   


 


Urine Nitrite   


 


Ur Leukocyte Esterase   


 


Urine WBC (Auto)   


 


Urine RBC (Auto)   














  05/07/20 05/07/20





  03:33 03:33


 


WBC  


 


RBC  


 


Hgb  


 


Hct  


 


MCV  


 


MCH  


 


MCHC  


 


RDW  


 


Plt Count  


 


Seg Neutrophils %  


 


VBG pH  


 


VBG pCO2  


 


VBG HCO3  


 


VBG Base Excess  


 


Sodium  


 


Potassium  


 


Chloride  


 


Carbon Dioxide  


 


Anion Gap  


 


BUN  


 


Creatinine  


 


Est GFR ( Amer)  


 


Est GFR (Non-Af Amer)  


 


Glucose  


 


Lactic Acid   1.9


 


Calcium  


 


Magnesium  


 


Total Bilirubin  


 


AST  


 


Alkaline Phosphatase  


 


Total Protein  


 


Albumin  


 


Triglycerides  


 


Cholesterol  


 


LDL Cholesterol Direct  


 


VLDL Cholesterol  


 


HDL Cholesterol  


 


TSH  < 0.01 L 


 


Urine Color  


 


Urine Appearance  


 


Urine pH  


 


Ur Specific Gravity  


 


Urine Protein  


 


Urine Glucose (UA)  


 


Urine Ketones  


 


Urine Blood  


 


Urine Nitrite  


 


Ur Leukocyte Esterase  


 


Urine WBC (Auto)  


 


Urine RBC (Auto)  








                                        











  05/06/20 05/06/20 05/06/20





  11:51 11:51 12:55


 


Creatine Kinase  Cancelled   255 H


 


CK-MB (CK-2)   Cancelled 


 


Troponin I   Cancelled 


 


NT-Pro-B Natriuret Pep   














  05/06/20 05/06/20 05/06/20





  12:55 12:55 19:13


 


Creatine Kinase   


 


CK-MB (CK-2)  1.94  


 


Troponin I  0.027   0.036


 


NT-Pro-B Natriuret Pep  286 H  Cancelled 














  05/06/20 05/07/20





  23:12 03:33


 


Creatine Kinase  


 


CK-MB (CK-2)  


 


Troponin I  0.045  0.033


 


NT-Pro-B Natriuret Pep   518 H











Impressions: 


                                        





Chest/Abdomen CTA  05/06/20 00:00


IMPRESSION:


1. No PE.


2. Chronic interstitial lung disease.  Right lower lobe pulmonary nodule.


 








Chest X-Ray  05/06/20 12:23


IMPRESSION:  Stable coarse bilateral interstitial opacities, likely secondary to

interstitial lung disease.


No definitive superimposed cardiopulmonary process.


 














Assessment and Plan





- Diagnosis


(1) Acute respiratory failure with hypoxia


Is this a current diagnosis for this admission?: Yes   


Plan: 


5/6/2020-patient is going to be admitted to [facility] as inpatient with a diagnosis 

of acute hypoxic respiratory failure with hypoxia.  To place him on BiPAP on as-

needed basis.  Started on DuoNeb nebulizations every 4 as needed, flutter valve 

therapy, Pulmicort therapy.  Started on IV Solu-Medrol 40 mg every 8 hours.  CT 

of the chest was requested because d-dimer is slightly elevated.  GI prophylaxis

DVT prophylaxis initiated.  Started on IV levofloxacin 750 mg daily.





5/7/2020-patient admitted with acute on chronic respiratory failure with hypoxia

still on BiPAP this morning.  Most likely cause is COPD exacerbation.  Plan is 

to continue IV Solu-Medrol, BiPAP on as needed basis nebulizer treatments and 

antibiotics.








(2) COPD with acute exacerbation


Is this a current diagnosis for this admission?: Yes   


Plan: 


5/6/20-patient has history of COPD on 2 L of oxygen via nasal cannula home 

presently he is on BiPAP.  On 40% oxygen pulse ox is around 96 to 97%.  Patient 

is still tachypneic and tachycardic at the time of my examination.  Started on 

DuoNeb nebulizations, Pulmicort, flutter valve therapy.  Started on prophylactic

antibiotic levofloxacin 750 mg daily.





5/7/2020-patient has history of COPD on 2 L of oxygen came in with hypoxia 

requiring BiPAP.  He is on BiPAP this morning.  Plan is to continue the present 

management at this time.








(3) Diabetes mellitus type 2 in obese


Is this a current diagnosis for this admission?: No   


Plan: 


5/6/2020-patient has history of type 2 diabetes mellitus on metformin at home.  

To hold metformin at this time and started on insulin sliding scale before meals

and at bedtime.  Hemoglobin A1c is requested.  Diet exercise weight loss lifes

tyle modifications  discussed with the patient.  Dietary consult requested.





5/7/2020-blood today is 197.  Hemoglobin A1c 6.1.  Plan is to continue the 

present management at this time.








(4) HTN (hypertension)


Qualifiers: 


   Hypertension type: essential hypertension   Qualified Code(s): I10 - 

Essential (primary) hypertension   


Is this a current diagnosis for this admission?: No   


Plan: 


5/6/2020-patient has history of chronic essential hypertension blood pressure in

the ER is 152/84.  Patient is on benazepril 20 mg daily, metoprolol 25 mg p.o. 

daily at home.  Those medications are resumed.





5/7/2020-blood pressure today is 142/87.  Stable.








(5) Morbid obesity


Is this a current diagnosis for this admission?: No

## 2020-05-08 LAB
ABSOLUTE LYMPHOCYTES# (MANUAL): 0.4 10^3/UL (ref 0.5–4.7)
ABSOLUTE MONOCYTES # (MANUAL): 0.3 10^3/UL (ref 0.1–1.4)
ADD MANUAL DIFF: YES
ALBUMIN SERPL-MCNC: 3.7 G/DL (ref 3.5–5)
ALP SERPL-CCNC: 64 U/L (ref 38–126)
ANION GAP SERPL CALC-SCNC: 6 MMOL/L (ref 5–19)
ANISOCYTOSIS BLD QL SMEAR: SLIGHT
AST SERPL-CCNC: 72 U/L (ref 17–59)
BASOPHILS NFR BLD MANUAL: 0 % (ref 0–2)
BILIRUB DIRECT SERPL-MCNC: 0 MG/DL (ref 0–0.4)
BILIRUB SERPL-MCNC: 0.3 MG/DL (ref 0.2–1.3)
BUN SERPL-MCNC: 42 MG/DL (ref 7–20)
CALCIUM: 9.5 MG/DL (ref 8.4–10.2)
CHLORIDE SERPL-SCNC: 109 MMOL/L (ref 98–107)
CO2 SERPL-SCNC: 25 MMOL/L (ref 22–30)
EOSINOPHIL NFR BLD MANUAL: 0 % (ref 0–6)
ERYTHROCYTE [DISTWIDTH] IN BLOOD BY AUTOMATED COUNT: 15.5 % (ref 11.5–14)
GLUCOSE SERPL-MCNC: 179 MG/DL (ref 75–110)
HCT VFR BLD CALC: 36.7 % (ref 37.9–51)
HGB BLD-MCNC: 12 G/DL (ref 13.5–17)
MCH RBC QN AUTO: 30 PG (ref 27–33.4)
MCHC RBC AUTO-ENTMCNC: 32.7 G/DL (ref 32–36)
MCV RBC AUTO: 92 FL (ref 80–97)
MONOCYTES % (MANUAL): 2 % (ref 3–13)
NEUTS HYPERSEG BLD QL SMEAR: PRESENT
PLATELET # BLD: 249 10^3/UL (ref 150–450)
PLATELET COMMENT: ADEQUATE
POIKILOCYTOSIS BLD QL SMEAR: SLIGHT
POTASSIUM SERPL-SCNC: 5.2 MMOL/L (ref 3.6–5)
PROT SERPL-MCNC: 6.3 G/DL (ref 6.3–8.2)
RBC # BLD AUTO: 4.01 10^6/UL (ref 4.35–5.55)
SCHISTOCYTES BLD QL SMEAR: SLIGHT
SEGMENTED NEUTROPHILS % (MAN): 95 % (ref 42–78)
TOTAL CELLS COUNTED BLD: 100
VARIANT LYMPHS NFR BLD MANUAL: 3 % (ref 13–45)
WBC # BLD AUTO: 14 10^3/UL (ref 4–10.5)

## 2020-05-08 RX ADMIN — HEPARIN SODIUM SCH UNIT: 5000 INJECTION, SOLUTION INTRAVENOUS; SUBCUTANEOUS at 21:35

## 2020-05-08 RX ADMIN — HEPARIN SODIUM SCH UNIT: 5000 INJECTION, SOLUTION INTRAVENOUS; SUBCUTANEOUS at 15:08

## 2020-05-08 RX ADMIN — METHYLPREDNISOLONE SODIUM SUCCINATE SCH MG: 40 INJECTION, POWDER, FOR SOLUTION INTRAMUSCULAR; INTRAVENOUS at 21:35

## 2020-05-08 RX ADMIN — UMECLIDINIUM SCH INH: 62.5 AEROSOL, POWDER ORAL at 09:49

## 2020-05-08 RX ADMIN — LEVOFLOXACIN SCH MLS/HR: 750 INJECTION, SOLUTION INTRAVENOUS at 09:49

## 2020-05-08 RX ADMIN — AMLODIPINE BESYLATE SCH MG: 10 TABLET ORAL at 09:49

## 2020-05-08 RX ADMIN — METOPROLOL TARTRATE SCH MG: 25 TABLET, FILM COATED ORAL at 18:06

## 2020-05-08 RX ADMIN — INSULIN HUMAN SCH: 100 INJECTION, SOLUTION PARENTERAL at 16:31

## 2020-05-08 RX ADMIN — INSULIN HUMAN SCH UNIT: 100 INJECTION, SOLUTION PARENTERAL at 12:36

## 2020-05-08 RX ADMIN — SODIUM CHLORIDE PRN MLS/HR: 9 INJECTION, SOLUTION INTRAVENOUS at 03:20

## 2020-05-08 RX ADMIN — METHYLPREDNISOLONE SODIUM SUCCINATE SCH MG: 40 INJECTION, POWDER, FOR SOLUTION INTRAMUSCULAR; INTRAVENOUS at 05:31

## 2020-05-08 RX ADMIN — LORATADINE SCH MG: 10 TABLET ORAL at 09:49

## 2020-05-08 RX ADMIN — LISINOPRIL SCH MG: 10 TABLET ORAL at 09:49

## 2020-05-08 RX ADMIN — FINASTERIDE SCH MG: 5 TABLET, FILM COATED ORAL at 09:49

## 2020-05-08 RX ADMIN — HEPARIN SODIUM SCH UNIT: 5000 INJECTION, SOLUTION INTRAVENOUS; SUBCUTANEOUS at 05:31

## 2020-05-08 RX ADMIN — FLUTICASONE FUROATE AND VILANTEROL TRIFENATATE SCH INH: 200; 25 POWDER RESPIRATORY (INHALATION) at 09:50

## 2020-05-08 RX ADMIN — INSULIN HUMAN SCH UNIT: 100 INJECTION, SOLUTION PARENTERAL at 08:57

## 2020-05-08 RX ADMIN — BENAZEPRIL HYDROCHLORIDE SCH MG: 20 TABLET ORAL at 09:49

## 2020-05-08 RX ADMIN — METOPROLOL TARTRATE SCH MG: 25 TABLET, FILM COATED ORAL at 05:31

## 2020-05-08 RX ADMIN — INSULIN HUMAN SCH UNIT: 100 INJECTION, SOLUTION PARENTERAL at 21:34

## 2020-05-08 RX ADMIN — PANTOPRAZOLE SODIUM SCH MG: 40 TABLET, DELAYED RELEASE ORAL at 05:31

## 2020-05-08 RX ADMIN — PANTOPRAZOLE SODIUM SCH MG: 40 TABLET, DELAYED RELEASE ORAL at 18:06

## 2020-05-08 RX ADMIN — TAMSULOSIN HYDROCHLORIDE SCH MG: 0.4 CAPSULE ORAL at 09:49

## 2020-05-08 RX ADMIN — SODIUM CHLORIDE PRN MLS/HR: 9 INJECTION, SOLUTION INTRAVENOUS at 18:08

## 2020-05-08 NOTE — PDOC PROGRESS REPORT
Subjective


Progress Note for:: 05/08/20


Subjective:: 





69 year old male with history of COPD on 2 L of oxygen, type 2 diabetes 

mellitus, morbid obesity, history of colon cancer status post colon resection in

1995, ex-smoker stopped smoking 4 years ago came to the emergency room with 

complaining of increasing shortness of breath.  Shortness of breath associated 

with occasional cough and continuous wheezing.  Denies any fevers denies any 

nausea vomiting diarrhea or abdominal pain.  Denies any headaches dizzy spells. 

Denies any chest pains.  Patient required BiPAP in the emergency room because 

for tachypnea and pulse ox is around 90% without BiPAP.  L consult was called 

for admission for COPD exacerbation and a COVID test is negative.  pt agreed to 

be in the hospital for further management.





5/[id number]-old male with history of COPD, ex-smoker admitted with hypoxia 

on BiPAP.  Patient is still on BiPAP this morning pulse ox is at 98% on 35% 

oxygen.  Comfortable in the bed sleeping well.  No complaints.  CT chest without

contrast was done it shows interstitial lung disease.  Presently on IV Solu-

Medrol, nebulizer treatments, and levofloxacillin.


Reason For Visit: 


COPD EXACERBATION








Physical Exam


Vital Signs: 


                                        











Temp Pulse Resp BP Pulse Ox


 


 97.5 F   28 L  22 H  164/83 H  97 


 


 05/08/20 07:43  05/08/20 09:07  05/08/20 09:07  05/08/20 07:43  05/08/20 09:07








                                 Intake & Output











 05/07/20 05/08/20 05/09/20





 06:59 06:59 06:59


 


Intake Total 350 4905 


 


Output Total 1125 1885 


 


Balance -775 3020 


 


Weight 95.9 kg 101.2 kg 











General appearance: PRESENT: no acute distress, morbidly obese, other - On BiPAP


Head exam: PRESENT: atraumatic


Eye exam: PRESENT: PERRLA


Mouth exam: PRESENT: moist, tongue midline


Teeth exam: PRESENT: poor dentation


Neck exam: ABSENT: carotid bruit, JVD, lymphadenopathy, thyromegaly


Respiratory exam: PRESENT: decreased breath sounds


Cardiovascular exam: PRESENT: RRR.  ABSENT: diastolic murmur, rubs, systolic 

murmur


Vascular exam: PRESENT: normal capillary refill


GI/Abdominal exam: PRESENT: normal bowel sounds, soft.  ABSENT: distended, 

guarding, mass, organolmegaly, rebound, tenderness


Rectal exam: PRESENT: deferred


Extremities exam: PRESENT: full ROM.  ABSENT: calf tenderness, clubbing, pedal 

edema


Neurological exam: PRESENT: alert, awake, oriented to person, oriented to place,

oriented to time, oriented to situation, CN II-XII grossly intact.  ABSENT: 

motor sensory deficit


Psychiatric exam: PRESENT: appropriate affect, normal mood.  ABSENT: homicidal 

ideation, suicidal ideation





Results


Laboratory Results: 


                                        





                                 05/08/20 06:05 





                                 05/08/20 06:05 





                                        











  05/08/20 05/08/20





  06:05 06:05


 


WBC  14.0 H 


 


RBC  4.01 L 


 


Hgb  12.0 L 


 


Hct  36.7 L 


 


MCV  92 


 


MCH  30.0 


 


MCHC  32.7 


 


RDW  15.5 H 


 


Plt Count  249 


 


Seg Neutrophils %  Not Reportable 


 


Sodium   139.8


 


Potassium   5.2 H


 


Chloride   109 H


 


Carbon Dioxide   25


 


Anion Gap   6


 


BUN   42 H


 


Creatinine   1.31 H


 


Est GFR (African Amer)   > 60


 


Glucose   179 H


 


Calcium   9.5


 


Magnesium   2.8 H


 


Total Bilirubin   0.3


 


AST   72 H


 


Alkaline Phosphatase   64


 


Total Protein   6.3


 


Albumin   3.7








                                        











  05/06/20 05/06/20 05/06/20





  11:51 11:51 12:55


 


Creatine Kinase  Cancelled   255 H


 


CK-MB (CK-2)   Cancelled 


 


Troponin I   Cancelled 


 


NT-Pro-B Natriuret Pep   














  05/06/20 05/06/20 05/06/20





  12:55 12:55 19:13


 


Creatine Kinase   


 


CK-MB (CK-2)  1.94  


 


Troponin I  0.027   0.036


 


NT-Pro-B Natriuret Pep  286 H  Cancelled 














  05/06/20 05/07/20





  23:12 03:33


 


Creatine Kinase  


 


CK-MB (CK-2)  


 


Troponin I  0.045  0.033


 


NT-Pro-B Natriuret Pep   518 H











Impressions: 


                                        





Chest/Abdomen CTA  05/06/20 00:00


IMPRESSION:


1. No PE.


2. Chronic interstitial lung disease.  Right lower lobe pulmonary nodule.


 








Chest X-Ray  05/06/20 12:23


IMPRESSION:  Stable coarse bilateral interstitial opacities, likely secondary to

interstitial lung disease.


No definitive superimposed cardiopulmonary process.


 














Assessment and Plan





- Diagnosis


(1) Acute respiratory failure with hypoxia


Is this a current diagnosis for this admission?: Yes   


Plan: 


5/6/2020-patient is going to be admitted to [facility] as inpatient with a diagnosis 

of acute hypoxic respiratory failure with hypoxia.  To place him on BiPAP on as-

needed basis.  Started on DuoNeb nebulizations every 4 as needed, flutter valve 

therapy, Pulmicort therapy.  Started on IV Solu-Medrol 40 mg every 8 hours.  CT 

of the chest was requested because d-dimer is slightly elevated.  GI prophylaxis

DVT prophylaxis initiated.  Started on IV levofloxacin 750 mg daily.





5/7/2020-patient admitted with acute on chronic respiratory failure with hypoxia

still on BiPAP this morning.  Most likely cause is COPD exacerbation.  Plan is 

to continue IV Solu-Medrol, BiPAP on as needed basis nebulizer treatments and 

antibiotics.








5/8/2020-patient still on BiPAP this morning.  Doing much better.  Pulse ox is 

97%.  Plan is to decrease the IV Solu-Medrol to every 12 hours.  Continue other 

treatment.








(2) COPD with acute exacerbation


Is this a current diagnosis for this admission?: Yes   


Plan: 


5/6/20-patient has history of COPD on 2 L of oxygen via nasal cannula home 

presently he is on BiPAP.  On 40% oxygen pulse ox is around 96 to 97%.  Patient 

is still tachypneic and tachycardic at the time of my examination.  Started on 

DuoNeb nebulizations, Pulmicort, flutter valve therapy.  Started on prophylactic

antibiotic levofloxacin 750 mg daily.





5/7/2020-patient has history of COPD on 2 L of oxygen came in with hypoxia 

requiring BiPAP.  He is on BiPAP this morning.  Plan is to continue the present 

management at this time.








5/8/20-patient has history of COPD, on CPAP at home and 2 L of oxygen.  Admitted

with COPD exacerbation.  Resolving.








(3) Diabetes mellitus type 2 in obese


Is this a current diagnosis for this admission?: No   


Plan: 


5/6/2020-patient has history of type 2 diabetes mellitus on metformin at home.  

To hold metformin at this time and started on insulin sliding scale before meals

and at bedtime.  Hemoglobin A1c is requested.  Diet exercise weight loss 

lifestyle modifications  discussed with the patient.  Dietary consult requested.





5/7/2020-blood today is 197.  Hemoglobin A1c 6.1.  Plan is to continue the 

present management at this time.





5/8/2020-latest blood sugar is 238 with a hemoglobin A1c of 6.1.  Presently on 

IV Solu-Medrol 40 mg every 8 hours plan is to decrease the dose to every 12 

hours.








(4) HTN (hypertension)


Qualifiers: 


   Hypertension type: essential hypertension   Qualified Code(s): I10 - 

Essential (primary) hypertension   


Is this a current diagnosis for this admission?: No   


Plan: 


5/6/2020-patient has history of chronic essential hypertension blood pressure in

the ER is 152/84.  Patient is on benazepril 20 mg daily, metoprolol 25 mg p.o. 

daily at home.  Those medications are resumed.





5/7/2020-blood pressure today is 142/87.  Stable.





5/8/2020-blood pressure today is 158/96.  Continue metoprolol 25 mg p.o. daily, 

benazepril 20 mg p.o. daily.  On low-salt diet.








(5) Morbid obesity


Is this a current diagnosis for this admission?: No   


Plan: 


5/6/2020-patient BMI is more than 35 diet exercise weight loss lifestyle 

modifications discussed with the patient.

## 2020-05-09 VITALS — SYSTOLIC BLOOD PRESSURE: 142 MMHG | DIASTOLIC BLOOD PRESSURE: 80 MMHG

## 2020-05-09 LAB
ABSOLUTE LYMPHOCYTES# (MANUAL): 0.5 10^3/UL (ref 0.5–4.7)
ABSOLUTE MONOCYTES # (MANUAL): 0.4 10^3/UL (ref 0.1–1.4)
ADD MANUAL DIFF: YES
ALBUMIN SERPL-MCNC: 3.5 G/DL (ref 3.5–5)
ALP SERPL-CCNC: 59 U/L (ref 38–126)
ANION GAP SERPL CALC-SCNC: 5 MMOL/L (ref 5–19)
ANISOCYTOSIS BLD QL SMEAR: (no result)
AST SERPL-CCNC: 69 U/L (ref 17–59)
BASOPHILS NFR BLD MANUAL: 0 % (ref 0–2)
BILIRUB DIRECT SERPL-MCNC: 0 MG/DL (ref 0–0.4)
BILIRUB SERPL-MCNC: 0.4 MG/DL (ref 0.2–1.3)
BUN SERPL-MCNC: 37 MG/DL (ref 7–20)
CALCIUM: 9.2 MG/DL (ref 8.4–10.2)
CHLORIDE SERPL-SCNC: 109 MMOL/L (ref 98–107)
CO2 SERPL-SCNC: 26 MMOL/L (ref 22–30)
EOSINOPHIL NFR BLD MANUAL: 0 % (ref 0–6)
ERYTHROCYTE [DISTWIDTH] IN BLOOD BY AUTOMATED COUNT: 15.4 % (ref 11.5–14)
GLUCOSE SERPL-MCNC: 182 MG/DL (ref 75–110)
HCT VFR BLD CALC: 36.1 % (ref 37.9–51)
HGB BLD-MCNC: 11.9 G/DL (ref 13.5–17)
MCH RBC QN AUTO: 30.5 PG (ref 27–33.4)
MCHC RBC AUTO-ENTMCNC: 33.1 G/DL (ref 32–36)
MCV RBC AUTO: 92 FL (ref 80–97)
MONOCYTES % (MANUAL): 4 % (ref 3–13)
NEUTS BAND NFR BLD MANUAL: 1 % (ref 3–5)
PLATELET # BLD: 240 10^3/UL (ref 150–450)
PLATELET COMMENT: ADEQUATE
POLYCHROMASIA BLD QL SMEAR: (no result)
POTASSIUM SERPL-SCNC: 5.1 MMOL/L (ref 3.6–5)
PROT SERPL-MCNC: 5.9 G/DL (ref 6.3–8.2)
RBC # BLD AUTO: 3.92 10^6/UL (ref 4.35–5.55)
SEGMENTED NEUTROPHILS % (MAN): 90 % (ref 42–78)
TOTAL CELLS COUNTED BLD: 100
VARIANT LYMPHS NFR BLD MANUAL: 5 % (ref 13–45)
WBC # BLD AUTO: 10.5 10^3/UL (ref 4–10.5)

## 2020-05-09 RX ADMIN — FINASTERIDE SCH MG: 5 TABLET, FILM COATED ORAL at 09:01

## 2020-05-09 RX ADMIN — AMLODIPINE BESYLATE SCH MG: 10 TABLET ORAL at 09:01

## 2020-05-09 RX ADMIN — METOPROLOL TARTRATE SCH MG: 25 TABLET, FILM COATED ORAL at 06:01

## 2020-05-09 RX ADMIN — LORATADINE SCH MG: 10 TABLET ORAL at 09:01

## 2020-05-09 RX ADMIN — UMECLIDINIUM SCH INH: 62.5 AEROSOL, POWDER ORAL at 09:02

## 2020-05-09 RX ADMIN — FLUTICASONE FUROATE AND VILANTEROL TRIFENATATE SCH INH: 200; 25 POWDER RESPIRATORY (INHALATION) at 09:02

## 2020-05-09 RX ADMIN — INSULIN HUMAN SCH UNIT: 100 INJECTION, SOLUTION PARENTERAL at 07:57

## 2020-05-09 RX ADMIN — BENAZEPRIL HYDROCHLORIDE SCH MG: 20 TABLET ORAL at 09:01

## 2020-05-09 RX ADMIN — PANTOPRAZOLE SODIUM SCH MG: 40 TABLET, DELAYED RELEASE ORAL at 06:01

## 2020-05-09 RX ADMIN — HEPARIN SODIUM SCH UNIT: 5000 INJECTION, SOLUTION INTRAVENOUS; SUBCUTANEOUS at 06:00

## 2020-05-09 RX ADMIN — METHYLPREDNISOLONE SODIUM SUCCINATE SCH: 40 INJECTION, POWDER, FOR SOLUTION INTRAMUSCULAR; INTRAVENOUS at 08:59

## 2020-05-09 RX ADMIN — SODIUM CHLORIDE PRN MLS/HR: 9 INJECTION, SOLUTION INTRAVENOUS at 06:00

## 2020-05-09 RX ADMIN — LISINOPRIL SCH MG: 10 TABLET ORAL at 09:01

## 2020-05-09 RX ADMIN — TAMSULOSIN HYDROCHLORIDE SCH MG: 0.4 CAPSULE ORAL at 09:01

## 2020-05-09 NOTE — PDOC DISCHARGE SUMMARY
Impression





- Admit/DC Date/PCP


Admission Date/Primary Care Provider: 


  05/06/20 16:42





  





Discharge Date: 05/09/20





- Discharge Diagnosis


(1) Acute respiratory failure with hypoxia


Is this a current diagnosis for this admission?: Yes   





(2) COPD with acute exacerbation


Is this a current diagnosis for this admission?: Yes   





(3) Diabetes mellitus type 2 in obese


Is this a current diagnosis for this admission?: No   





(4) HTN (hypertension)


Is this a current diagnosis for this admission?: No   





(5) Morbid obesity


Is this a current diagnosis for this admission?: No   





- Assessment


Summary: 


(1) Acute respiratory failure with hypoxia


Is this a current diagnosis for this admission?: Yes   


Plan: 


5/6/2020-patient is going to be admitted to [facility] as inpatient with a diagnosis 

of acute hypoxic respiratory failure with hypoxia.  To place him on BiPAP on as-

needed basis.  Started on DuoNeb nebulizations every 4 as needed, flutter valve 

therapy, Pulmicort therapy.  Started on IV Solu-Medrol 40 mg every 8 hours.  CT 

of the chest was requested because d-dimer is slightly elevated.  GI prophylaxis

DVT prophylaxis initiated.  Started on IV levofloxacin 750 mg daily.





5/7/2020-patient admitted with acute on chronic respiratory failure with hypoxia

still on BiPAP this morning.  Most likely cause is COPD exacerbation.  Plan is 

to continue IV Solu-Medrol, BiPAP on as needed basis nebulizer treatments and 

antibiotics.








5/8/2020-patient still on BiPAP this morning.  Doing much better.  Pulse ox is 

97%.  Plan is to decrease the IV Solu-Medrol to every 12 hours.  Continue other 

treatment.





5/9/2020-on oxygen supplementation this morning doing well.  I requested the 

patient to stay at least another day for further improvement.  Patient responded

that he is doing very well he prefers to go home and use the BiPAP and oxygen 

supplementations at home.  [facility]








(2) COPD with acute exacerbation


Is this a current diagnosis for this admission?: Yes   


Plan: 


5/6/20-patient has history of COPD on 2 L of oxygen via nasal cannula home 

presently he is on BiPAP.  On 40% oxygen pulse ox is around 96 to 97%.  Patient 

is still tachypneic and tachycardic at the time of my examination.  Started on 

DuoNeb nebulizations, Pulmicort, flutter valve therapy.  Started on prophylactic

antibiotic levofloxacin 750 mg daily.





5/7/2020-patient has history of COPD on 2 L of oxygen came in with hypoxia 

requiring BiPAP.  He is on BiPAP this morning.  Plan is to continue the present 

management at this time.








5/8/20-patient has history of COPD, on CPAP at home and 2 L of oxygen.  Admitted

with COPD exacerbation.  Resolving.





5/9/2020-patient has history of COPD admitted with COPD exacerbation resolving. 

Pulse ox is 96% 3 L this morning.  Just came back negative is going home on 

levofloxacin 750 mg p.o. daily for 1 week.








(3) Diabetes mellitus type 2 in obese


Is this a current diagnosis for this admission?: No   


Plan: 


5/6/2020-patient has history of type 2 diabetes mellitus on metformin at home.  

To hold metformin at this time and started on insulin sliding scale before meals

and at bedtime.  Hemoglobin A1c is requested.  Diet exercise weight loss 

lifestyle modifications  discussed with the patient.  Dietary consult requested.





5/7/2020-blood today is 197.  Hemoglobin A1c 6.1.  Plan is to continue the 

present management at this time.





5/8/2020-latest blood sugar is 238 with a hemoglobin A1c of 6.1.  Presently on 

IV Solu-Medrol 40 mg every 8 hours plan is to decrease the dose to every 12 

hours.





5/9/2020-patient has history of type 2 diabetes mellitus plan is to resume his 

home medications his blood sugar is 182 during the hospital stay.








(4) HTN (hypertension)


Qualifiers: 


   Hypertension type: essential hypertension   Qualified Code(s): I10 - 

Essential (primary) hypertension   


Is this a current diagnosis for this admission?: No   


Plan: 


5/6/2020-patient has history of chronic essential hypertension blood pressure in

the ER is 152/84.  Patient is on benazepril 20 mg daily, metoprolol 25 mg p.o. 

daily at home.  Those medications are resumed.





5/7/2020-blood pressure today is 142/87.  Stable.





5/8/2020-blood pressure today is 158/96.  Continue metoprolol 25 mg p.o. daily, 

benazepril 20 mg p.o. daily.  On low-salt diet.





5/9/2020-blood pressure today is 132/63 stable.  Patient is advised to resume 

his home medications.








(5) Morbid obesity


Is this a current diagnosis for this admission?: No   


Plan: 


5/6/2020-patient BMI is more than 35 diet exercise weight loss lifestyle 

modifications discussed with the patient.











- Additional Information


Resuscitation Status: Full Code


Discharge Diet: Cardiac, Diabetic


Discharge Activity: Activity As Tolerated


Prescriptions: 


Levofloxacin [Levaquin 750 mg Tablet] 750 mg PO DAILY #7 tablet


Home Medications: 








Albuterol Sulfate [Proair HFA] 2 puff IH Q4HP PRN 01/14/17 


Atorvastatin Calcium 40 mg PO QHS 01/14/17 


Montelukast Sodium 10 mg PO QHS 01/14/17 


Tamsulosin HCl 0.8 mg PO DAILY 01/14/17 


Amlodipine Besylate [Norvasc 10 mg Tablet] 10 mg PO DAILY 05/07/20 


Budesonide/Formoterol Fumarate [Symbicort -4.5 mcg Inhaler 6 gm] 1 puff 

IH Q12 05/07/20 


Finasteride [Proscar 5 mg Tablet] 5 mg PO DAILY 05/07/20 


Lisinopril [Zestril] 40 mg PO DAILY 05/07/20 


Loratadine [Claritin 10 mg Tablet] 10 mg PO DAILY 05/07/20 


Sitagliptin Phos/Metformin HCl [Janumet  mg Tablet] 1 each PO BID 05/07/20




Tiotropium Bromide [Spiriva Respimat] 2 puff IH DAILY 05/07/20 


Levofloxacin [Levaquin 750 mg Tablet] 750 mg PO DAILY #7 tablet 05/09/20 











History of Present Illiness


History of Present Illness: 


ERIN MORENO is a 69 year old male with history of COPD on 2 L of oxygen, type 

2 diabetes mellitus, morbid obesity, history of colon cancer status post colon 

resection in 1995, ex-smoker stopped smoking 4 years ago came to the emergency 

room with complaining of increasing shortness of breath.  Shortness of breath 

associated with occasional cough and continuous wheezing.  Denies any fevers 

denies any nausea vomiting diarrhea or abdominal pain.  Denies any headaches di

zzy spells.  Denies any chest pains.  Patient required BiPAP in the emergency 

room because for tachypnea and pulse ox is around 90% without BiPAP.  L consult 

was called for admission for COPD exacerbation and a COVID test is negative.  pt

agreed to be in the hospital for further management.








Hospital Course


Hospital Course: 


69 year old male with history of COPD on 2 L of oxygen, type 2 diabetes 

mellitus, morbid obesity, history of colon cancer status post colon resection in

1995, ex-smoker stopped smoking 4 years ago came to the emergency room with comp

laining of increasing shortness of breath.  Shortness of breath associated with 

occasional cough and continuous wheezing.  Denies any fevers denies any nausea 

vomiting diarrhea or abdominal pain.  Denies any headaches dizzy spells.  Denies

any chest pains.  Patient required BiPAP in the emergency room because for 

tachypnea and pulse ox is around 90% without BiPAP.  L consult was called for 

admission for COPD exacerbation and a COVID test is negative.  pt agreed to be 

in the hospital for further management.





5/[id number]-old male with history of COPD, ex-smoker admitted with hypoxia 

on BiPAP.  Patient is still on BiPAP this morning pulse ox is at 98% on 35% 

oxygen.  Comfortable in the bed sleeping well.  No complaints.  CT chest without

contrast was done it shows interstitial lung disease.  Presently on IV Solu-

Medrol, nebulizer treatments, and levofloxacillin.





Physical Exam


Vital Signs: 


                                        











Temp Pulse Resp BP Pulse Ox


 


 97.3 F   51 L  17   142/80 H  99 


 


 05/09/20 09:39  05/09/20 09:39  05/09/20 09:39  05/09/20 09:39  05/09/20 09:39








                                 Intake & Output











 05/08/20 05/09/20 05/10/20





 06:59 06:59 06:59


 


Intake Total 4905 3560 


 


Output Total 1885 4350 


 


Balance 3020 -790 


 


Weight 101.2 kg 101.1 kg 











General appearance: PRESENT: no acute distress, obese


Head exam: PRESENT: atraumatic


Eye exam: PRESENT: PERRLA


Mouth exam: PRESENT: moist, tongue midline


Teeth exam: PRESENT: poor dentation


Neck exam: ABSENT: carotid bruit, JVD, lymphadenopathy, thyromegaly


Respiratory exam: PRESENT: decreased breath sounds


Cardiovascular exam: PRESENT: RRR.  ABSENT: diastolic murmur, rubs, systolic 

murmur


GI/Abdominal exam: PRESENT: normal bowel sounds, soft.  ABSENT: distended, 

guarding, mass, organolmegaly, rebound, tenderness


Rectal exam: PRESENT: deferred


Neurological exam: PRESENT: alert, awake, oriented to person, oriented to place,

oriented to time, oriented to situation, CN II-XII grossly intact.  ABSENT: 

motor sensory deficit


Psychiatric exam: PRESENT: appropriate affect, normal mood.  ABSENT: homicidal 

ideation, suicidal ideation





Results


Laboratory Results: 


                                        











WBC  10.5 10^3/uL (4.0-10.5)   05/09/20  05:38    


 


RBC  3.92 10^6/uL (4.35-5.55)  L  05/09/20  05:38    


 


Hgb  11.9 g/dL (13.5-17.0)  L  05/09/20  05:38    


 


Hct  36.1 % (37.9-51.0)  L  05/09/20  05:38    


 


MCV  92 fl (80-97)   05/09/20  05:38    


 


MCH  30.5 pg (27.0-33.4)   05/09/20  05:38    


 


MCHC  33.1 g/dL (32.0-36.0)   05/09/20  05:38    


 


RDW  15.4 % (11.5-14.0)  H  05/09/20  05:38    


 


Plt Count  240 10^3/uL (150-450)   05/09/20  05:38    


 


Lymph % (Auto)  Not Reportable   05/09/20  05:38    


 


Mono % (Auto)  Not Reportable   05/09/20  05:38    


 


Eos % (Auto)  Not Reportable   05/09/20  05:38    


 


Baso % (Auto)  Not Reportable   05/09/20  05:38    


 


Absolute Neuts (auto)  Not Reportable   05/09/20  05:38    


 


Absolute Lymphs (auto)  Not Reportable   05/09/20  05:38    


 


Absolute Monos (auto)  Not Reportable   05/09/20  05:38    


 


Absolute Eos (auto)  Not Reportable   05/09/20  05:38    


 


Absolute Basos (auto)  Not Reportable   05/09/20  05:38    


 


Total Counted  100   05/09/20  05:38    


 


Seg Neutrophils %  Not Reportable   05/09/20  05:38    


 


Seg Neuts % (Manual)  90 % (42-78)  H  05/09/20  05:38    


 


Band Neutrophils %  1 % (3-5)  L  05/09/20  05:38    


 


Lymphocytes % (Manual)  5 % (13-45)  L  05/09/20  05:38    


 


Monocytes % (Manual)  4 % (3-13)   05/09/20  05:38    


 


Eosinophils % (Manual)  0 % (0-6)   05/09/20  05:38    


 


Basophils % (Manual)  0 % (0-2)   05/09/20  05:38    


 


Abs Neuts (Manual)  9.6 10^3/uL (1.7-8.2)  H  05/09/20  05:38    


 


Abs Lymphs (Manual)  0.5 10^3/uL (0.5-4.7)   05/09/20  05:38    


 


Abs Monocytes (Manual)  0.4 10^3/uL (0.1-1.4)   05/09/20  05:38    


 


Absolute Eos (Manual)  0.0 10^3/uL (0.0-0.6)   05/09/20  05:38    


 


Abs Basophils (Manual)  0.0 10^3/uL (0.0-0.2)   05/09/20  05:38    


 


Hypersegmented Neuts  PRESENT   05/08/20  06:05    


 


Platelet Estimate  Cancelled   05/06/20  11:51    


 


Clumped Platelets  PRESENT   05/06/20  12:55    


 


Platelet Comment  ADEQUATE   05/09/20  05:38    


 


Polychromasia  1+   05/09/20  05:38    


 


Poikilocytosis  SLIGHT   05/08/20  06:05    


 


Anisocytosis  1+   05/09/20  05:38    


 


Schistocytes  SLIGHT   05/08/20  06:05    


 


RBC Morph Comment  NORMO-CYTIC/CHROMIC   05/06/20  12:55    


 


PT  13.3 SEC (11.4-15.4)   05/06/20  12:55    


 


PT  Cancelled   05/06/20  12:55    


 


INR  1.01   05/06/20  12:55    


 


INR  Cancelled   05/06/20  12:55    


 


INR (Anticoag Therapy)  Cancelled   05/06/20  12:55    


 


APTT  29.9 SEC (23.5-35.8)   05/06/20  12:55    


 


D-Dimer  0.82 ug/mL (0.00-0.50)  H  05/06/20  12:55    


 


VBG pH  7.27  (7.30-7.42)  L  05/06/20  12:55    


 


VBG pCO2  54.5 mmHg (35-63)   05/06/20  12:55    


 


VBG HCO3  24.7 mmol/L (20-32)   05/06/20  12:55    


 


VBG Base Excess  -2.8 mmol/L  05/06/20  12:55    


 


Sodium  139.5 mmol/L (137-145)   05/09/20  05:38    


 


Potassium  5.1 mmol/L (3.6-5.0)  H  05/09/20  05:38    


 


Chloride  109 mmol/L ()  H  05/09/20  05:38    


 


Carbon Dioxide  26 mmol/L (22-30)   05/09/20  05:38    


 


Anion Gap  5  (5-19)   05/09/20  05:38    


 


BUN  37 mg/dL (7-20)  H  05/09/20  05:38    


 


Creatinine  1.10 mg/dL (0.52-1.25)   05/09/20  05:38    


 


Est GFR ( Amer)  > 60  (>60)   05/09/20  05:38    


 


Est GFR (Non-Af Amer)  Cancelled   05/06/20  11:51    


 


Est GFR (MDRD) Non-Af  > 60  (>60)   05/09/20  05:38    


 


Glucose  182 mg/dL ()  H  05/09/20  05:38    


 


POC Glucose  160 mg/dL ()  H  05/09/20  07:33    


 


Hemoglobin A1c %  6.1 % (4.7-6.0)  H  05/07/20  03:33    


 


Lactic Acid  1.9 mmol/L (0.7-2.1)   05/07/20  03:33    


 


Calcium  9.2 mg/dL (8.4-10.2)   05/09/20  05:38    


 


Magnesium  2.6 mg/dL (1.6-2.3)  H  05/09/20  05:38    


 


Total Bilirubin  0.4 mg/dL (0.2-1.3)   05/09/20  05:38    


 


Direct Bilirubin  0.0 mg/dL (0.0-0.4)   05/09/20  05:38    


 


Neonat Total Bilirubin  Not Reportable   05/09/20  05:38    


 


Neonat Direct Bilirubin  Not Reportable   05/09/20  05:38    


 


Neonat Indirect Bili  Not Reportable   05/09/20  05:38    


 


AST  69 U/L (17-59)  H  05/09/20  05:38    


 


ALT  244 U/L (<50)  H  05/09/20  05:38    


 


Alkaline Phosphatase  59 U/L ()   05/09/20  05:38    


 


Creatine Kinase  255 U/L ()  H  05/06/20  12:55    


 


CK-MB (CK-2)  1.94 ng/mL (<4.55)   05/06/20  12:55    


 


Troponin I  0.033 ng/mL  05/07/20  03:33    


 


NT-Pro-B Natriuret Pep  518 pg/mL (<125)  H  05/07/20  03:33    


 


Total Protein  5.9 g/dL (6.3-8.2)  L  05/09/20  05:38    


 


Albumin  3.5 g/dL (3.5-5.0)   05/09/20  05:38    


 


Triglycerides  133 mg/dL (<150)   05/07/20  03:33    


 


Cholesterol  149.96 mg/dL (0-200)   05/07/20  03:33    


 


LDL Cholesterol Direct  72 mg/dL (<100)   05/07/20  03:33    


 


VLDL Cholesterol  27.0 mg/dL (10-31)   05/07/20  03:33    


 


HDL Cholesterol  58 mg/dL (>40)   05/07/20  03:33    


 


EGFR   Cancelled   05/06/20  11:51    


 


TSH  < 0.01 uIU/mL (0.47-4.68)  L  05/07/20  03:33    


 


Urine Color  YELLOW   05/06/20  14:11    


 


Urine Appearance  CLEAR   05/06/20  14:11    


 


Urine pH  5.0  (5.0-9.0)   05/06/20  14:11    


 


Ur Specific Gravity  1.009   05/06/20  14:11    


 


Urine Protein  >=500 mg/dL (NEGATIVE)  H  05/06/20  14:11    


 


Urine Glucose (UA)  50 mg/dL (NEGATIVE)  H  05/06/20  14:11    


 


Urine Ketones  NEGATIVE mg/dL (NEGATIVE)   05/06/20  14:11    


 


Urine Blood  MODERATE  (NEGATIVE)  H  05/06/20  14:11    


 


Urine Nitrite  NEGATIVE  (NEGATIVE)   05/06/20  14:11    


 


Urine Bilirubin  NEGATIVE  (NEGATIVE)   05/06/20  14:11    


 


Urine Urobilinogen  NEGATIVE mg/dL (<2.0)   05/06/20  14:11    


 


Ur Leukocyte Esterase  NEGATIVE  (NEGATIVE)   05/06/20  14:11    


 


Urine WBC (Auto)  1 /HPF  05/06/20  14:11    


 


Urine RBC (Auto)  2 /HPF  05/06/20  14:11    


 


Urine Bacteria (Auto)  TRACE /HPF  05/06/20  14:11    


 


Squamous Epi Cells Auto  <1 /HPF  05/06/20  14:11    


 


Urine Mucus (Auto)  RARE /LPF  05/06/20  14:11    


 


Urine Ascorbic Acid  NEGATIVE  (NEGATIVE)   05/06/20  14:11    


 


COVID-19 Source  Cancelled   05/06/20  13:48    


 


COVID-19 (TACOS)  Cancelled   05/06/20  13:48    


 


Influenza A (Rapid)  NEGATIVE  (NEGATIVE)   05/06/20  13:25    


 


Influenza B (Rapid)  NEGATIVE  (NEGATIVE)   05/06/20  13:25    


 


SARS-CoV-2 (PCR)  NEGATIVE  (NEGATIVE)   05/06/20  13:48    


 


Group A Strep Rapid  NEGATIVE  (NEGATIVE)   05/06/20  13:25    


 


Slides for Path Review  Cancelled   05/06/20  11:51    








                                        











  05/06/20 05/06/20 05/06/20





  11:51 12:55 12:55


 


CK-MB (CK-2)  Cancelled  1.94 


 


Troponin I  Cancelled  0.027 


 


NT-Pro-B Natriuret Pep   286 H  Cancelled














  05/06/20 05/06/20 05/07/20





  19:13 23:12 03:33


 


CK-MB (CK-2)   


 


Troponin I  0.036  0.045  0.033


 


NT-Pro-B Natriuret Pep    518 H











Impressions: 


                                        





Chest/Abdomen CTA  05/06/20 00:00


IMPRESSION:


1. No PE.


2. Chronic interstitial lung disease.  Right lower lobe pulmonary nodule.


 








Chest X-Ray  05/06/20 12:23


IMPRESSION:  Stable coarse bilateral interstitial opacities, likely secondary to

interstitial lung disease.


No definitive superimposed cardiopulmonary process.


 














Plan


Plan of Treatment: 


Patient is advised to be compliant with the BiPAP machine and also continue to 

use oxygen at home.  [facility] 

[facility].  Given a prescription for levofloxacin 750 mg daily for 7 days.  Patient 

agreed with the plan and verbalized response.


Time Spent: Greater than 30 Minutes





Stroke


Is this a Stroke Patient?: No





Acute Heart Failure





- **


Is this a Heart Failure Patient?: No